# Patient Record
Sex: FEMALE | Race: WHITE | NOT HISPANIC OR LATINO | Employment: FULL TIME | URBAN - METROPOLITAN AREA
[De-identification: names, ages, dates, MRNs, and addresses within clinical notes are randomized per-mention and may not be internally consistent; named-entity substitution may affect disease eponyms.]

---

## 2018-04-18 ENCOUNTER — TRANSFERRED RECORDS (OUTPATIENT)
Dept: HEALTH INFORMATION MANAGEMENT | Facility: CLINIC | Age: 23
End: 2018-04-18

## 2018-04-18 LAB — HIV 1&2 EXT: NORMAL

## 2020-01-23 NOTE — PROGRESS NOTES
Subjective     Joelle De La Torre is a 24 year old female who presents to clinic today for the following health issues:    Patient is on birth control but is unsure of he name.     History of Present Illness        She eats 2-3 servings of fruits and vegetables daily.She consumes 0 sweetened beverage(s) daily.She exercises with enough effort to increase her heart rate 20 to 29 minutes per day.  She exercises with enough effort to increase her heart rate 4 days per week.   She is taking medications regularly.     Vaginal Symptoms  Onset: 2 years     Description:  Vaginal Discharge: none right now, did have curd like discharge at one time  Itching (Pruritis): YES  Burning sensation:  yes  Odor: Yes a little, not so much anymore     Accompanying Signs & Symptoms:  Pain with Urination: no   Abdominal Pain: yes during sexual intercourse    Fever: no     History:   Sexually active: YES  New Partner: no   Possibility of Pregnancy:  No    Precipitating factors:   Recent Antibiotic Use: no     Alleviating factors:      Therapies Tried and outcome: Has been tested for every STD, patient states that she has severe itching in her vaginal area, gets micro cuts after sexual intercourse. Has tried monistat that made her worse, vagisil helped a little       -------------------------------------    Patient Active Problem List   Diagnosis     Anxiety disorder     Attention deficit hyperactivity disorder (ADHD)     Dysmenorrhea     Dermatographia     History reviewed. No pertinent surgical history.    Social History     Tobacco Use     Smoking status: Never Smoker     Smokeless tobacco: Never Used   Substance Use Topics     Alcohol use: Yes     Frequency: 2-3 times a week     Drinks per session: 1 or 2     Binge frequency: Never     History reviewed. No pertinent family history.        Reviewed and updated as needed this visit by Provider  Tobacco  Allergies  Meds  Problems  Med Hx  Surg Hx  Fam Hx         Review of Systems   ROS  "COMP: Constitutional, HEENT, cardiovascular, pulmonary, GI, , musculoskeletal, neuro, skin, endocrine and psych systems are negative, except as otherwise noted.      Objective    /54 (BP Location: Left arm, Patient Position: Chair, Cuff Size: Adult Regular)   Pulse 112   Temp 99  F (37.2  C) (Temporal)   Resp 16   Ht 1.727 m (5' 8\")   Wt 60.3 kg (133 lb)   LMP 01/26/2020 (Approximate)   SpO2 99%   Breastfeeding No   BMI 20.22 kg/m    Body mass index is 20.22 kg/m .  Physical Exam   GENERAL: healthy, alert and no distress  RESP: lungs clear to auscultation - no rales, rhonchi or wheezes  CV: regular rate and rhythm, normal S1 S2, no S3 or S4, no murmur, click or rub, no peripheral edema and peripheral pulses strong  ABDOMEN: soft, nontender, no hepatosplenomegaly, no masses and bowel sounds normal   (female): normal female external genitalia other than dry skin with cracking posteriorly between the vaginal and rectal areas, normal urethral meatus, vaginal mucosa pink, moist, well rugated, and normal cervix/adnexa/uterus without masses or discharge  MS: no gross musculoskeletal defects noted, no edema  SKIN: dry skin throughout body  NEURO: Normal strength and tone, mentation intact and speech normal  PSYCH: anxious and tearful            Assessment & Plan       ICD-10-CM    1. Flexural eczema L20.82 hydrocortisone (CORTAID) 1 % external ointment   2. Screening for malignant neoplasm of cervix Z12.4           Mucosa appears normal for the vaginal area. Currently on menses, so screening for wet prep limited, may return to repeat if skin irritation treatment does not help. Previously she was reported to have some vaginal atrophy. But this appears to have improved by her hormonal treatment. Now she just has ezematous skin worse in the vaginal to rectal area. I was surprised by this location, but then she told me she has been doing warm epsan soaks with blowdryer afterwards per her last ob/gyn " recommendations which likely has overdried the area. Given her dermatographia, she is already at risk for eczema. So reasonable to give a catch up steroid dose for the area (mild due to folds and doubled up dosing) and then care instructions given as well. See below. Continue current ocps at this time as they may have helped.    Patient Instructions   Water can be your best friend or worst enemy.    If water is not your enemy, shower/bath daily.    NEVER soak in bubble bath, oils, etc.    Keep them to 15-30 minutes in lukewarm (NOT HOT) water.  After shower, pat/blot dry and apply moisturizer within minutes    Products that are advised for eczema include:  Soaps -- Dove, Caress, or Basis (only need in underarms and groin unless dirt noted)  Lotions -- Cera Ve, Cetaphil, Vanicream, Vaseline  Petroleum jelly can be used for extra moisturizer when needed but is greasy.          Return in about 3 months (around 4/29/2020) for Routine Visit.    Nicole Szymanski MD, MD  [facility]

## 2020-01-29 ENCOUNTER — OFFICE VISIT (OUTPATIENT)
Dept: FAMILY MEDICINE | Facility: OTHER | Age: 25
End: 2020-01-29
Payer: COMMERCIAL

## 2020-01-29 VITALS
HEIGHT: 68 IN | SYSTOLIC BLOOD PRESSURE: 102 MMHG | RESPIRATION RATE: 16 BRPM | TEMPERATURE: 99 F | HEART RATE: 112 BPM | WEIGHT: 133 LBS | OXYGEN SATURATION: 99 % | DIASTOLIC BLOOD PRESSURE: 54 MMHG | BODY MASS INDEX: 20.16 KG/M2

## 2020-01-29 DIAGNOSIS — Z12.4 SCREENING FOR MALIGNANT NEOPLASM OF CERVIX: ICD-10-CM

## 2020-01-29 DIAGNOSIS — L20.82 FLEXURAL ECZEMA: Primary | ICD-10-CM

## 2020-01-29 PROBLEM — F90.9 ATTENTION DEFICIT HYPERACTIVITY DISORDER (ADHD): Status: ACTIVE | Noted: 2020-01-29

## 2020-01-29 PROBLEM — L50.3 DERMATOGRAPHIA: Status: ACTIVE | Noted: 2020-01-29

## 2020-01-29 PROBLEM — L50.9 URTICARIA: Status: ACTIVE | Noted: 2020-01-29

## 2020-01-29 PROCEDURE — 99203 OFFICE O/P NEW LOW 30 MIN: CPT | Performed by: FAMILY MEDICINE

## 2020-01-29 RX ORDER — LEVONORGESTREL/ETHIN.ESTRADIOL 0.1-0.02MG
1 TABLET ORAL DAILY
Qty: 84 TABLET | Status: CANCELLED | OUTPATIENT
Start: 2020-01-29

## 2020-01-29 RX ORDER — DIAPER,BRIEF,INFANT-TODD,DISP
EACH MISCELLANEOUS DAILY
Qty: 110 G | Refills: 1 | Status: SHIPPED | OUTPATIENT
Start: 2020-01-29 | End: 2020-02-05

## 2020-01-29 SDOH — HEALTH STABILITY: MENTAL HEALTH: HOW MANY STANDARD DRINKS CONTAINING ALCOHOL DO YOU HAVE ON A TYPICAL DAY?: 1 OR 2

## 2020-01-29 SDOH — HEALTH STABILITY: MENTAL HEALTH: HOW OFTEN DO YOU HAVE A DRINK CONTAINING ALCOHOL?: 2-3 TIMES A WEEK

## 2020-01-29 SDOH — HEALTH STABILITY: MENTAL HEALTH: HOW OFTEN DO YOU HAVE 6 OR MORE DRINKS ON ONE OCCASION?: NEVER

## 2020-01-29 ASSESSMENT — MIFFLIN-ST. JEOR: SCORE: 1391.78

## 2020-01-29 NOTE — PATIENT INSTRUCTIONS
Water can be your best friend or worst enemy.    If water is not your enemy, shower/bath daily.    NEVER soak in bubble bath, oils, etc.    Keep them to 15-30 minutes in lukewarm (NOT HOT) water.  After shower, pat/blot dry and apply moisturizer within minutes    Products that are advised for eczema include:  Soaps -- Dove, Caress, or Basis (only need in underarms and groin unless dirt noted)  Lotions -- Cera Ve, Cetaphil, Vanicream, Vaseline  Petroleum jelly can be used for extra moisturizer when needed but is greasy.

## 2020-02-10 ENCOUNTER — TELEPHONE (OUTPATIENT)
Dept: FAMILY MEDICINE | Facility: OTHER | Age: 25
End: 2020-02-10

## 2020-02-10 NOTE — TELEPHONE ENCOUNTER
"Spoke to patient.     States she was recently prescribed cortisone for vaginal eczema.   Used once per day for 7 days.     Since then, noticing \"huge white patches\"   States they were zit size and are getting larger.   Now they are open sores about pen tip size.   States they are now \"open lesions.\"    States she thinks things are getting much worse since stopping the steroid cream.     Also using another lotion on skin.     No baths.   Discharge has gotten at worse, heavier and is now thick and white.   Not enough to wear a pad for the discharge.   States it burns, states it looks like blisters.   Not enough for her to be seen in ED or UC.   States sitting is uncomfortable at this point.     States she is also having some hives off and on. Go away pretty quickly without treatment.     RN advised to be seen sooner. Patient states she wants to only see Dr. Szymanski. Patient states she will call to get seen sooner.     Leonila Vines RN BSN    "

## 2020-02-10 NOTE — TELEPHONE ENCOUNTER
Reason for call:  Patient reporting a symptom    Symptom or request: worsening vaginal issues    Duration (how long have symptoms been present): ~1-2 weeks    Have you been treated for this before? Yes    Additional comments: She seen Stephon on 1/29/20, issue is getting worse. Getting blisters and rx didn't help. Scheduled with Stephon 2/17, but she would like to speak with a triage nurse. Please triage.     Phone Number patient can be reached at:  Home number on file [phone] (home)    Best Time:  any    Can we leave a detailed message on this number:  YES    Call taken on 2/10/2020 at 3:38 PM by Kathy Fischer

## 2020-02-17 NOTE — PROGRESS NOTES
Subjective     Joelle De La Torre is a 24 year old female who presents to clinic today for the following health issues:    HPI   Vaginal Symptoms  Onset:   Currently on last day of meds     Description:  Vaginal Discharge: white   Itching (Pruritis): no   Burning sensation:  no   Odor: no     Accompanying Signs & Symptoms:  Pain with Urination: no   Abdominal Pain: no   Fever: no     History:   Sexually active: YES  New Partner: no   Possibility of Pregnancy:  No    Precipitating factors:   Recent Antibiotic Use: no     Alleviating factors:     Has been on a course of fluconazole      -------------------------------------    Patient Active Problem List   Diagnosis     Anxiety disorder     Attention deficit hyperactivity disorder (ADHD)     Dysmenorrhea     Dermatographia     History reviewed. No pertinent surgical history.    Social History     Tobacco Use     Smoking status: Never Smoker     Smokeless tobacco: Never Used   Substance Use Topics     Alcohol use: Yes     Frequency: 2-3 times a week     Drinks per session: 1 or 2     Binge frequency: Never     History reviewed. No pertinent family history.        Reviewed and updated as needed this visit by Provider  Tobacco  Allergies  Meds  Problems  Med Hx  Surg Hx  Fam Hx         Review of Systems   ROS COMP: Constitutional, HEENT, cardiovascular, pulmonary, GI, , musculoskeletal, neuro, skin, endocrine and psych systems are negative, except as otherwise noted.      Objective    BP 92/60   Pulse 78   Temp 98  F (36.7  C)   Resp 16   Wt 59.9 kg (132 lb)   LMP 01/26/2020 (Approximate)   BMI 20.07 kg/m    Body mass index is 20.07 kg/m .  Physical Exam   GENERAL: healthy, alert and no distress  RESP: lungs clear to auscultation - no rales, rhonchi or wheezes  CV: regular rate and rhythm, normal S1 S2, no S3 or S4, no murmur, click or rub, no peripheral edema and peripheral pulses strong  ABDOMEN: soft, nontender, no hepatosplenomegaly, no masses and bowel  sounds normal   (female): normal female external genitalia, normal urethral meatus, vaginal mucosa pink, moist, well rugated, and normal cervix/adnexa/uterus without masses or discharge  PSYCH: mentation appears normal, affect normal/bright            Assessment & Plan       ICD-10-CM    1. Vaginal discharge N89.8 Wet prep   2. BV (bacterial vaginosis) N76.0 metroNIDAZOLE 500 MG PO tablet    B96.89           Reassured, just BV this time. She noted increase in discharge and was worried about return of yeast. Turns out timing is just before her menses and explained that thicker discharge happens at this time. Though she also has BV which the menstrual flow may clear up for many. But also can offer her meds to clear it up as well.    Return in about 2 weeks (around 3/4/2020) for if not improved.    Nicole Szymanski MD, MD  [facility]

## 2020-02-19 ENCOUNTER — OFFICE VISIT (OUTPATIENT)
Dept: FAMILY MEDICINE | Facility: OTHER | Age: 25
End: 2020-02-19
Payer: COMMERCIAL

## 2020-02-19 VITALS
RESPIRATION RATE: 16 BRPM | TEMPERATURE: 98 F | BODY MASS INDEX: 20.07 KG/M2 | WEIGHT: 132 LBS | SYSTOLIC BLOOD PRESSURE: 92 MMHG | DIASTOLIC BLOOD PRESSURE: 60 MMHG | HEART RATE: 78 BPM

## 2020-02-19 DIAGNOSIS — B96.89 BV (BACTERIAL VAGINOSIS): ICD-10-CM

## 2020-02-19 DIAGNOSIS — N76.0 BV (BACTERIAL VAGINOSIS): ICD-10-CM

## 2020-02-19 DIAGNOSIS — N89.8 VAGINAL DISCHARGE: Primary | ICD-10-CM

## 2020-02-19 PROCEDURE — 99213 OFFICE O/P EST LOW 20 MIN: CPT | Performed by: FAMILY MEDICINE

## 2020-02-19 PROCEDURE — 87210 SMEAR WET MOUNT SALINE/INK: CPT | Performed by: FAMILY MEDICINE

## 2020-02-19 RX ORDER — DESOGESTREL AND ETHINYL ESTRADIOL 0.15-0.03
1 KIT ORAL
COMMUNITY
Start: 2019-08-08 | End: 2020-09-30

## 2020-02-19 RX ORDER — NYSTATIN 100000 U/G
1 OINTMENT TOPICAL
COMMUNITY
Start: 2020-02-10 | End: 2020-02-20

## 2020-02-19 RX ORDER — FLUCONAZOLE 100 MG/1
100 TABLET ORAL
COMMUNITY
Start: 2020-02-10 | End: 2020-09-30

## 2020-02-19 RX ORDER — METRONIDAZOLE 500 MG/1
500 TABLET ORAL 2 TIMES DAILY
Qty: 14 TABLET | Refills: 0 | Status: SHIPPED | OUTPATIENT
Start: 2020-02-19 | End: 2020-02-26

## 2020-02-19 ASSESSMENT — PAIN SCALES - GENERAL: PAINLEVEL: NO PAIN (0)

## 2020-02-20 LAB
SPECIMEN SOURCE: ABNORMAL
WET PREP SPEC: ABNORMAL

## 2020-08-17 NOTE — PROGRESS NOTES
SUBJECTIVE:   CC: Joelle De La Torre is an 25 year old woman who presents for preventive health visit.     Healthy Habits:     Getting at least 3 servings of Calcium per day:  NO    Bi-annual eye exam:  NO    Dental care twice a year:  NO    Sleep apnea or symptoms of sleep apnea:  None    Diet:  Regular (no restrictions)    Frequency of exercise:  1 day/week    Duration of exercise:  Less than 15 minutes    Taking medications regularly:  Yes    Barriers to taking medications:  None    Medication side effects:  None    PHQ-2 Total Score: 3    Additional concerns today:  No      Discussed valencia h/o ADHD and stopped taking meds as she got anxious with her mom's death many years ago. Has had since childhood and records on care everywhere. She has been off all meds for awhile and is having compounding anxiety now again. Started with counseling this am and identified focus as an area to work on and she came with the question whether she should consider return to [facility].         Today's PHQ-2 Score:   PHQ-2 ( 1999 [employer]) 8/20/2020   Q1: Little interest or pleasure in doing things 1   Q2: Feeling down, depressed or hopeless 2   PHQ-2 Score 3   Q1: Little interest or pleasure in doing things Several days   Q2: Feeling down, depressed or hopeless More than half the days   PHQ-2 Score 3       Abuse: Current or Past(Physical, Sexual or Emotional)- No  Do you feel safe in your environment? Yes        Social History     Tobacco Use     Smoking status: Never Smoker     Smokeless tobacco: Never Used   Substance Use Topics     Alcohol use: Yes     Frequency: 2-3 times a week     Drinks per session: 1 or 2     Binge frequency: Never     If you drink alcohol do you typically have >3 drinks per day or >7 drinks per week? No    Alcohol Use 8/20/2020   Prescreen: >3 drinks/day or >7 drinks/week? No   Prescreen: >3 drinks/day or >7 drinks/week? -       Reviewed orders with patient.  Reviewed health maintenance and updated orders  "accordingly - Yes      Mammogram not appropriate for this patient based on age.    Pertinent mammograms are reviewed under the imaging tab.  History of abnormal Pap smear: NO - age 21-29 PAP every 3 years recommended     Reviewed and updated as needed this visit by clinical staff  Tobacco  Allergies  Meds  Problems  Med Hx  Surg Hx  Fam Hx  Soc Hx          Reviewed and updated as needed this visit by Provider  Tobacco  Allergies  Meds  Problems  Med Hx  Surg Hx  Fam Hx            Review of Systems   Constitutional: Negative for chills and fever.   HENT: Negative for congestion, ear pain, hearing loss and sore throat.    Eyes: Negative for pain and visual disturbance.   Respiratory: Negative for cough and shortness of breath.    Cardiovascular: Positive for chest pain. Negative for palpitations and peripheral edema.   Gastrointestinal: Negative for abdominal pain, constipation, diarrhea, heartburn, hematochezia and nausea.   Breasts:  Negative for tenderness, breast mass and discharge.   Genitourinary: Negative for dysuria, frequency, genital sores, hematuria, pelvic pain, urgency, vaginal bleeding and vaginal discharge.   Musculoskeletal: Negative for arthralgias, joint swelling and myalgias.   Skin: Negative for rash.   Neurological: Positive for weakness and headaches. Negative for dizziness and paresthesias.   Psychiatric/Behavioral: Positive for mood changes. The patient is nervous/anxious.           OBJECTIVE:   /70   Pulse 80   Temp 97.5  F (36.4  C)   Resp 14   Ht 1.753 m (5' 9\")   Wt 58.7 kg (129 lb 8 oz)   LMP 08/13/2020 (Approximate)   BMI 19.12 kg/m    Physical Exam  GENERAL: healthy, alert and no distress  EYES: Eyes grossly normal to inspection, PERRL and conjunctivae and sclerae normal  HENT: ear canals and TM's normal, nose and mouth without ulcers or lesions  NECK: no adenopathy, no asymmetry, masses, or scars and thyroid normal to palpation  RESP: lungs clear to " auscultation - no rales, rhonchi or wheezes  CV: regular rate and rhythm, normal S1 S2, no S3 or S4, no murmur, click or rub, no peripheral edema and peripheral pulses strong  ABDOMEN: soft, nontender, no hepatosplenomegaly, no masses and bowel sounds normal   (female): normal female external genitalia, normal urethral meatus, vaginal mucosa pink, moist, well rugated, and normal cervix/adnexa/uterus without masses or discharge  MS: no gross musculoskeletal defects noted, no edema  SKIN: no suspicious lesions or rashes  NEURO: Normal strength and tone, mentation intact and speech normal  PSYCH: anxious today        ASSESSMENT/PLAN:       ICD-10-CM    1. Routine general medical examination at a health care facility  Z00.00    2. Generalized anxiety disorder  F41.1    3. Attention deficit hyperactivity disorder (ADHD), predominantly inattentive type  F90.0 amphetamine-dextroamphetamine (ADDERALL XR) 15 MG 24 hr capsule     Drug Abuse Screen Panel 13, Urine (Pain Care Package)   4. Screening for malignant neoplasm of cervix  Z12.4 Pap imaged thin layer screen reflex to HPV if ASCUS - recommend age 25 - 29         PAtient had long history of ADHD which she has not been treating but has been noticing more difficulty in functioning again. Has an upcoming class which she states she will not be able to complete without some help. She has anxiety at this time as welll and has been working with counseling. Will need close monitoring for control. Discussed how stimulants can worsening anxiety and that the primary issue for her as she was growing up was adhd, so likely she may still find improvement. Will attempt to return to her last known dose for control. Follow-up 3 weeks.    COUNSELING:  Reviewed preventive health counseling, as reflected in patient instructions       Regular exercise       Healthy diet/nutrition    Estimated body mass index is 19.12 kg/m  as calculated from the following:    Height as of this encounter:  "1.753 m (5' 9\").    Weight as of this encounter: 58.7 kg (129 lb 8 oz).         reports that she has never smoked. She has never used smokeless tobacco.      Counseling Resources:  ATP IV Guidelines  Pooled Cohorts Equation Calculator  Breast Cancer Risk Calculator  FRAX Risk Assessment  ICSI Preventive Guidelines  Dietary Guidelines for Americans, 2010  [employer]'s My[employer]ate  ASA Prophylaxis  Lung CA Screening    Nicole Szymanski MD, MD  [facility]  Answers for HPI/ROS submitted by the patient on 8/20/2020   Annual Exam:  If you checked off any problems, how difficult have these problems made it for you to do your work, take care of things at home, or get along with other people?: Very difficult  PHQ9 TOTAL SCORE: 15  DAYSI 7 TOTAL SCORE: 10    "

## 2020-08-20 ENCOUNTER — OFFICE VISIT (OUTPATIENT)
Dept: FAMILY MEDICINE | Facility: OTHER | Age: 25
End: 2020-08-20
Payer: COMMERCIAL

## 2020-08-20 VITALS
WEIGHT: 129.5 LBS | TEMPERATURE: 97.5 F | SYSTOLIC BLOOD PRESSURE: 104 MMHG | DIASTOLIC BLOOD PRESSURE: 70 MMHG | RESPIRATION RATE: 14 BRPM | BODY MASS INDEX: 19.18 KG/M2 | HEIGHT: 69 IN | HEART RATE: 80 BPM

## 2020-08-20 DIAGNOSIS — Z00.00 ROUTINE GENERAL MEDICAL EXAMINATION AT A HEALTH CARE FACILITY: Primary | ICD-10-CM

## 2020-08-20 DIAGNOSIS — F90.0 ATTENTION DEFICIT HYPERACTIVITY DISORDER (ADHD), PREDOMINANTLY INATTENTIVE TYPE: ICD-10-CM

## 2020-08-20 DIAGNOSIS — Z12.4 SCREENING FOR MALIGNANT NEOPLASM OF CERVIX: ICD-10-CM

## 2020-08-20 DIAGNOSIS — F41.1 GENERALIZED ANXIETY DISORDER: ICD-10-CM

## 2020-08-20 LAB
AMPHETAMINES UR QL: NOT DETECTED NG/ML
BARBITURATES UR QL SCN: NOT DETECTED NG/ML
BENZODIAZ UR QL SCN: NOT DETECTED NG/ML
BUPRENORPHINE UR QL: NOT DETECTED NG/ML
CANNABINOIDS UR QL: NOT DETECTED NG/ML
COCAINE UR QL SCN: NOT DETECTED NG/ML
D-METHAMPHET UR QL: NOT DETECTED NG/ML
METHADONE UR QL SCN: NOT DETECTED NG/ML
OPIATES UR QL SCN: NOT DETECTED NG/ML
OXYCODONE UR QL SCN: NOT DETECTED NG/ML
PCP UR QL SCN: NOT DETECTED NG/ML
PROPOXYPH UR QL: NOT DETECTED NG/ML
TRICYCLICS UR QL SCN: NOT DETECTED NG/ML

## 2020-08-20 PROCEDURE — G0145 SCR C/V CYTO,THINLAYER,RESCR: HCPCS | Performed by: FAMILY MEDICINE

## 2020-08-20 PROCEDURE — 99213 OFFICE O/P EST LOW 20 MIN: CPT | Mod: 25 | Performed by: FAMILY MEDICINE

## 2020-08-20 PROCEDURE — 80306 DRUG TEST PRSMV INSTRMNT: CPT | Performed by: FAMILY MEDICINE

## 2020-08-20 PROCEDURE — 99395 PREV VISIT EST AGE 18-39: CPT | Performed by: FAMILY MEDICINE

## 2020-08-20 RX ORDER — DEXTROAMPHETAMINE SACCHARATE, AMPHETAMINE ASPARTATE MONOHYDRATE, DEXTROAMPHETAMINE SULFATE AND AMPHETAMINE SULFATE 3.75; 3.75; 3.75; 3.75 MG/1; MG/1; MG/1; MG/1
15 CAPSULE, EXTENDED RELEASE ORAL DAILY
Qty: 30 CAPSULE | Refills: 0 | Status: SHIPPED | OUTPATIENT
Start: 2020-08-20 | End: 2020-09-30

## 2020-08-20 ASSESSMENT — ENCOUNTER SYMPTOMS
EYE PAIN: 0
JOINT SWELLING: 0
MYALGIAS: 0
SHORTNESS OF BREATH: 0
ARTHRALGIAS: 0
HEMATURIA: 0
FREQUENCY: 0
WEAKNESS: 1
CHILLS: 0
BREAST MASS: 0
DYSURIA: 0
ABDOMINAL PAIN: 0
PALPITATIONS: 0
NAUSEA: 0
DIARRHEA: 0
DIZZINESS: 0
SORE THROAT: 0
NERVOUS/ANXIOUS: 1
HEMATOCHEZIA: 0
FEVER: 0
HEADACHES: 1
COUGH: 0
HEARTBURN: 0
PARESTHESIAS: 0
CONSTIPATION: 0

## 2020-08-20 ASSESSMENT — PATIENT HEALTH QUESTIONNAIRE - PHQ9
SUM OF ALL RESPONSES TO PHQ QUESTIONS 1-9: 15
5. POOR APPETITE OR OVEREATING: SEVERAL DAYS
10. IF YOU CHECKED OFF ANY PROBLEMS, HOW DIFFICULT HAVE THESE PROBLEMS MADE IT FOR YOU TO DO YOUR WORK, TAKE CARE OF THINGS AT HOME, OR GET ALONG WITH OTHER PEOPLE: VERY DIFFICULT
SUM OF ALL RESPONSES TO PHQ QUESTIONS 1-9: 15

## 2020-08-20 ASSESSMENT — ANXIETY QUESTIONNAIRES
GAD7 TOTAL SCORE: 10
7. FEELING AFRAID AS IF SOMETHING AWFUL MIGHT HAPPEN: SEVERAL DAYS
5. BEING SO RESTLESS THAT IT IS HARD TO SIT STILL: SEVERAL DAYS
GAD7 TOTAL SCORE: 10
6. BECOMING EASILY ANNOYED OR IRRITABLE: SEVERAL DAYS
1. FEELING NERVOUS, ANXIOUS, OR ON EDGE: MORE THAN HALF THE DAYS
3. WORRYING TOO MUCH ABOUT DIFFERENT THINGS: MORE THAN HALF THE DAYS
GAD7 TOTAL SCORE: 11
6. BECOMING EASILY ANNOYED OR IRRITABLE: SEVERAL DAYS
IF YOU CHECKED OFF ANY PROBLEMS ON THIS QUESTIONNAIRE, HOW DIFFICULT HAVE THESE PROBLEMS MADE IT FOR YOU TO DO YOUR WORK, TAKE CARE OF THINGS AT HOME, OR GET ALONG WITH OTHER PEOPLE: SOMEWHAT DIFFICULT
5. BEING SO RESTLESS THAT IT IS HARD TO SIT STILL: MORE THAN HALF THE DAYS
2. NOT BEING ABLE TO STOP OR CONTROL WORRYING: MORE THAN HALF THE DAYS
3. WORRYING TOO MUCH ABOUT DIFFERENT THINGS: MORE THAN HALF THE DAYS
7. FEELING AFRAID AS IF SOMETHING AWFUL MIGHT HAPPEN: SEVERAL DAYS
2. NOT BEING ABLE TO STOP OR CONTROL WORRYING: MORE THAN HALF THE DAYS
GAD7 TOTAL SCORE: 10
4. TROUBLE RELAXING: SEVERAL DAYS
7. FEELING AFRAID AS IF SOMETHING AWFUL MIGHT HAPPEN: SEVERAL DAYS
1. FEELING NERVOUS, ANXIOUS, OR ON EDGE: MORE THAN HALF THE DAYS

## 2020-08-20 ASSESSMENT — PAIN SCALES - GENERAL: PAINLEVEL: NO PAIN (0)

## 2020-08-20 ASSESSMENT — MIFFLIN-ST. JEOR: SCORE: 1386.79

## 2020-08-20 NOTE — LETTER
August 26, 2020      Joelle De La Torre  [id number] [facility] [address]    Dear MsElizabeth,      I am happy to inform you that your recent cervical cancer screening test (PAP smear) was normal.      Preventative screenings such as this help to ensure your health for years to come. You should repeat a pap smear in 3 years, unless otherwise directed.      You will still need to return to the clinic every year for your annual exam and other preventive tests.     If you have additional questions regarding this result, please call our registered nurse, Lillie at [phone].      Sincerely,      Nicole Szymanski MD/guerline

## 2020-08-20 NOTE — LETTER
August 20, 2020      Joelle De La Torre  [id number] [facility] [place] [address]        Dear Joelle,     Your recent lab testing was normal.    Results for orders placed or performed in visit on 08/20/20   Drug Abuse Screen Panel 13, Urine (Pain Care Package)     Status: None   Result Value Ref Range    Cannabinoids (70-frs-4-carboxy-9-THC) Not Detected NDET^Not Detected ng/mL    Phencyclidine (Phencyclidine) Not Detected NDET^Not Detected ng/mL    Cocaine (Benzoylecgonine) Not Detected NDET^Not Detected ng/mL    Methamphetamine (d-Methamphetamine) Not Detected NDET^Not Detected ng/mL    Opiates (Morphine) Not Detected NDET^Not Detected ng/mL    Amphetamine (d-Amphetamine) Not Detected NDET^Not Detected ng/mL    Benzodiazepines (Nordiazepam) Not Detected NDET^Not Detected ng/mL    Tricyclic Antidepressants (Desipramine) Not Detected NDET^Not Detected ng/mL    Methadone (Methadone) Not Detected NDET^Not Detected ng/mL    Barbiturates (Butalbital) Not Detected NDET^Not Detected ng/mL    Oxycodone (Oxycodone) Not Detected NDET^Not Detected ng/mL    Propoxyphene (Norpropoxyphene) Not Detected NDET^Not Detected ng/mL    Buprenorphine (Buprenorphine) Not Detected NDET^Not Detected ng/mL       Please let us know if you have any further questions or concerns.    Thank you,  [facility] Care Team

## 2020-08-21 ASSESSMENT — PATIENT HEALTH QUESTIONNAIRE - PHQ9: SUM OF ALL RESPONSES TO PHQ QUESTIONS 1-9: 15

## 2020-08-24 LAB
COPATH REPORT: NORMAL
PAP: NORMAL

## 2020-09-25 NOTE — PROGRESS NOTES
"SUBJECTIVE:  Joelle is here today to recheck ADHD/ADD.    Updates since last visit: is feeling fatigue    Routine for taking medicine, including time: 7:00 AM  Time medicine wears off: NA  Issues at school: NA  Issues at home: little emotion but good  Control of symptoms: Yes    Side effects:  Headaches: No  Stomach aches: No  Irritability/mood swings: No  Difficulties with sleep: No  Social withdrawal: Yes   Unusual movements/tics: No  Decreased appetite: Yes     Other concerns: Feeling like a \"zombie\"    Patient Active Problem List   Diagnosis     Anxiety disorder     Attention deficit hyperactivity disorder (ADHD)     Dysmenorrhea     Dermatographia       No past medical history on file.    No past surgical history on file.    Current Outpatient Medications   Medication     amphetamine-dextroamphetamine (ADDERALL XR) 15 MG 24 hr capsule     desogestrel-ethinyl estradiol 0.15-30 MG-MCG PO tablet     fluconazole 100 MG PO tablet     No current facility-administered medications for this visit.        OBJECTIVE:  There were no vitals taken for this visit.  Gen: alert, awake, NAD  Lungs: CTA jerry  Heart: RR without murmur  Psych: mood flat, she thinks it may be energy related as she really hasn't eaten well and feels so tired. So she has tried to force herself to eat more food and just feels bloated and full      ASSESSMENT:  1. Attention deficit hyperactivity disorder (ADHD), predominantly inattentive type        PLAN:  Discussed and agreed to a trial of lower dose meds, though she is appearing to have gained 2 lbs despite the c/o appetite loss, though this may be time of day eating and food choices. She is c/o atypical side effects in several ways, so may need to keep a close eye on mood as well.  Ok for virtual, plan follow-up 1 month.  No follow-ups on file.      Nicole Szymanski MD      Answers for HPI/ROS submitted by the patient on 9/30/2020   Chronic problems general questions HPI Form  If you checked off any problems, " how difficult have these problems made it for you to do your work, take care of things at home, or get along with other people?: Not difficult at all  PHQ9 TOTAL SCORE: 1  DAYSI 7 TOTAL SCORE: 0  How many servings of fruits and vegetables do you eat daily?: 0-1  On average, how many sweetened beverages do you drink each day (Examples: soda, juice, sweet tea, etc.  Do NOT count diet or artificially sweetened beverages)?: 0  How many minutes a day do you exercise enough to make your heart beat faster?: 10 to 19  How many days a week do you exercise enough to make your heart beat faster?: 3 or less  How many days per week do you miss taking your medication?: 0

## 2020-09-30 ENCOUNTER — OFFICE VISIT (OUTPATIENT)
Dept: FAMILY MEDICINE | Facility: OTHER | Age: 25
End: 2020-09-30
Payer: COMMERCIAL

## 2020-09-30 VITALS
OXYGEN SATURATION: 98 % | HEART RATE: 110 BPM | RESPIRATION RATE: 14 BRPM | WEIGHT: 131.5 LBS | TEMPERATURE: 98.3 F | DIASTOLIC BLOOD PRESSURE: 72 MMHG | BODY MASS INDEX: 19.42 KG/M2 | SYSTOLIC BLOOD PRESSURE: 120 MMHG

## 2020-09-30 DIAGNOSIS — F41.1 GENERALIZED ANXIETY DISORDER: ICD-10-CM

## 2020-09-30 DIAGNOSIS — F90.0 ATTENTION DEFICIT HYPERACTIVITY DISORDER (ADHD), PREDOMINANTLY INATTENTIVE TYPE: Primary | ICD-10-CM

## 2020-09-30 PROCEDURE — 90686 IIV4 VACC NO PRSV 0.5 ML IM: CPT | Performed by: FAMILY MEDICINE

## 2020-09-30 PROCEDURE — 99213 OFFICE O/P EST LOW 20 MIN: CPT | Mod: 25 | Performed by: FAMILY MEDICINE

## 2020-09-30 PROCEDURE — 90472 IMMUNIZATION ADMIN EACH ADD: CPT | Performed by: FAMILY MEDICINE

## 2020-09-30 PROCEDURE — 90715 TDAP VACCINE 7 YRS/> IM: CPT | Performed by: FAMILY MEDICINE

## 2020-09-30 PROCEDURE — 90471 IMMUNIZATION ADMIN: CPT | Performed by: FAMILY MEDICINE

## 2020-09-30 RX ORDER — DEXTROAMPHETAMINE SACCHARATE, AMPHETAMINE ASPARTATE MONOHYDRATE, DEXTROAMPHETAMINE SULFATE AND AMPHETAMINE SULFATE 2.5; 2.5; 2.5; 2.5 MG/1; MG/1; MG/1; MG/1
10 CAPSULE, EXTENDED RELEASE ORAL DAILY
Qty: 30 CAPSULE | Refills: 0 | Status: SHIPPED | OUTPATIENT
Start: 2020-09-30 | End: 2021-01-18

## 2020-09-30 ASSESSMENT — ANXIETY QUESTIONNAIRES
2. NOT BEING ABLE TO STOP OR CONTROL WORRYING: NOT AT ALL
GAD7 TOTAL SCORE: 0
GAD7 TOTAL SCORE: 0
4. TROUBLE RELAXING: NOT AT ALL
6. BECOMING EASILY ANNOYED OR IRRITABLE: NOT AT ALL
3. WORRYING TOO MUCH ABOUT DIFFERENT THINGS: NOT AT ALL
7. FEELING AFRAID AS IF SOMETHING AWFUL MIGHT HAPPEN: NOT AT ALL
1. FEELING NERVOUS, ANXIOUS, OR ON EDGE: NOT AT ALL
7. FEELING AFRAID AS IF SOMETHING AWFUL MIGHT HAPPEN: NOT AT ALL
GAD7 TOTAL SCORE: 0
5. BEING SO RESTLESS THAT IT IS HARD TO SIT STILL: NOT AT ALL

## 2020-09-30 ASSESSMENT — PATIENT HEALTH QUESTIONNAIRE - PHQ9
SUM OF ALL RESPONSES TO PHQ QUESTIONS 1-9: 1
SUM OF ALL RESPONSES TO PHQ QUESTIONS 1-9: 1
10. IF YOU CHECKED OFF ANY PROBLEMS, HOW DIFFICULT HAVE THESE PROBLEMS MADE IT FOR YOU TO DO YOUR WORK, TAKE CARE OF THINGS AT HOME, OR GET ALONG WITH OTHER PEOPLE: NOT DIFFICULT AT ALL

## 2020-10-01 ASSESSMENT — ANXIETY QUESTIONNAIRES: GAD7 TOTAL SCORE: 0

## 2020-10-01 ASSESSMENT — PATIENT HEALTH QUESTIONNAIRE - PHQ9: SUM OF ALL RESPONSES TO PHQ QUESTIONS 1-9: 1

## 2020-12-21 ENCOUNTER — TELEPHONE (OUTPATIENT)
Dept: FAMILY MEDICINE | Facility: OTHER | Age: 25
End: 2020-12-21

## 2021-01-15 NOTE — PROGRESS NOTES
ICD-10-CM    1. Attention deficit hyperactivity disorder (ADHD), predominantly inattentive type  F90.0 amphetamine-dextroamphetamine (ADDERALL XR) 10 MG 24 hr capsule       Discussed and signed controlled substance agreement again and plan to have 1 month recheck after regular use. Did not follow-up after last initial start, though she states she did really well with the med. Just had a lot of other things going on.        15 minutes spent on the date of the encounter doing chart review, history and exam, documentation and further activities as noted above    Nicole Szymanski MD       Subjective     Joelle De La Torre is a 25 year old female who presents to clinic today for the following health issues accompanied by her :    History of Present Illness       She eats 2-3 servings of fruits and vegetables daily.She consumes 0 sweetened beverage(s) daily.She exercises with enough effort to increase her heart rate 10 to 19 minutes per day.  She exercises with enough effort to increase her heart rate 3 or less days per week.   She is taking medications regularly.       SUBJECTIVE:  Patient is here today to recheck ADHD/ADD.    Updates since last visit: none, ran out of meds. Forgot about it since the honey moon and holidays  Routine for taking medicine, including time: 6 am  Time medicine wears off: finished the work day  Issues at school/Work: none while on meds  Issues at home: social interactions are worse since running out  Control of symptoms: well when taking meds. Ran out    Side effects:  Headaches: No  Stomach aches: No  Irritability/mood swings: No  Difficulties with sleep: No  Social withdrawal: No  Unusual movements/tics: No  Decreased appetite: No    Other concerns: ran out of meds. Not sure if HAs on weekend were due to planning for wedding, etc or if related to meds.      Review of Systems   Constitutional, HEENT, cardiovascular, pulmonary, GI, , musculoskeletal, neuro, skin, endocrine and psych systems are  "negative, except as otherwise noted.      Objective    /58   Pulse 97   Temp 98.9  F (37.2  C) (Temporal)   Resp 16   Ht 1.75 m (5' 8.9\")   Wt 60.8 kg (134 lb)   LMP 12/28/2020 (Exact Date)   SpO2 100%   BMI 19.85 kg/m    Body mass index is 19.85 kg/m .  Physical Exam   GENERAL: healthy, alert and no distress  RESP: lungs clear to auscultation - no rales, rhonchi or wheezes  CV: regular rate and rhythm, normal S1 S2, no S3 or S4, no murmur, click or rub, no peripheral edema and peripheral pulses strong  MS: no gross musculoskeletal defects noted, no edema  PSYCH: mentation appears normal, affect normal/bright          Answers for HPI/ROS submitted by the patient on 1/18/2021   Chronic problems general questions HPI Form  If you checked off any problems, how difficult have these problems made it for you to do your work, take care of things at home, or get along with other people?: Not difficult at all  PHQ9 TOTAL SCORE: 1  DYASI 7 TOTAL SCORE: 3    "

## 2021-01-18 ENCOUNTER — OFFICE VISIT (OUTPATIENT)
Dept: FAMILY MEDICINE | Facility: OTHER | Age: 26
End: 2021-01-18
Payer: COMMERCIAL

## 2021-01-18 VITALS
RESPIRATION RATE: 16 BRPM | WEIGHT: 134 LBS | HEART RATE: 97 BPM | DIASTOLIC BLOOD PRESSURE: 58 MMHG | TEMPERATURE: 98.9 F | SYSTOLIC BLOOD PRESSURE: 104 MMHG | OXYGEN SATURATION: 100 % | BODY MASS INDEX: 19.85 KG/M2 | HEIGHT: 69 IN

## 2021-01-18 DIAGNOSIS — F90.0 ATTENTION DEFICIT HYPERACTIVITY DISORDER (ADHD), PREDOMINANTLY INATTENTIVE TYPE: ICD-10-CM

## 2021-01-18 PROCEDURE — 99213 OFFICE O/P EST LOW 20 MIN: CPT | Performed by: FAMILY MEDICINE

## 2021-01-18 RX ORDER — DEXTROAMPHETAMINE SACCHARATE, AMPHETAMINE ASPARTATE MONOHYDRATE, DEXTROAMPHETAMINE SULFATE AND AMPHETAMINE SULFATE 2.5; 2.5; 2.5; 2.5 MG/1; MG/1; MG/1; MG/1
10 CAPSULE, EXTENDED RELEASE ORAL DAILY
Qty: 30 CAPSULE | Refills: 0 | Status: SHIPPED | OUTPATIENT
Start: 2021-01-18 | End: 2021-07-16

## 2021-01-18 ASSESSMENT — PATIENT HEALTH QUESTIONNAIRE - PHQ9
SUM OF ALL RESPONSES TO PHQ QUESTIONS 1-9: 1
10. IF YOU CHECKED OFF ANY PROBLEMS, HOW DIFFICULT HAVE THESE PROBLEMS MADE IT FOR YOU TO DO YOUR WORK, TAKE CARE OF THINGS AT HOME, OR GET ALONG WITH OTHER PEOPLE: NOT DIFFICULT AT ALL
SUM OF ALL RESPONSES TO PHQ QUESTIONS 1-9: 1

## 2021-01-18 ASSESSMENT — ANXIETY QUESTIONNAIRES
2. NOT BEING ABLE TO STOP OR CONTROL WORRYING: SEVERAL DAYS
6. BECOMING EASILY ANNOYED OR IRRITABLE: NOT AT ALL
1. FEELING NERVOUS, ANXIOUS, OR ON EDGE: SEVERAL DAYS
3. WORRYING TOO MUCH ABOUT DIFFERENT THINGS: SEVERAL DAYS
5. BEING SO RESTLESS THAT IT IS HARD TO SIT STILL: NOT AT ALL
4. TROUBLE RELAXING: NOT AT ALL
GAD7 TOTAL SCORE: 3
7. FEELING AFRAID AS IF SOMETHING AWFUL MIGHT HAPPEN: NOT AT ALL
7. FEELING AFRAID AS IF SOMETHING AWFUL MIGHT HAPPEN: NOT AT ALL

## 2021-01-18 ASSESSMENT — MIFFLIN-ST. JEOR: SCORE: 1405.58

## 2021-01-18 NOTE — LETTER
[facility]  01/18/21    Patient: Joelle De La Torre  YOB: 1995  Medical Record Number: [id number]  CSN: [id number]                                                                              Non-opioid Controlled Substance Agreement    I understand that my care provider has prescribed a controlled substance to help manage my condition(s). I am taking this medicine to help me function or work. I know this is strong medicine, and that it can cause serious side effects. Controlled substances can be sedating, addicting and may cause a dependency on the drug. They can affect my ability to drive or think, and cause depression. They need to be taken exactly as prescribed. Combining controlled substances with certain medicines or chemicals (such as cocaine, sedatives and tranquilizers, sleeping pills, meth) can be dangerous or even fatal. Also, if I stop controlled substances suddenly, I may have severe withdrawal symptoms.  If not helpful, I may be asked to stop them.    The risks, benefits, and side effects of these medicine(s) were explained to me. I agree that:    1. I will take part in other treatments as advised by my care team. This may be psychiatry or counseling, physical therapy, behavioral therapy, group treatment or a referral to a pain clinic. I will reduce or stop my medicine when my care team tells me to do so.  2. I will take my medicines as prescribed. I will not change the dose or schedule unless my care team tells me to. There will be no refills if I  run out early.   I may be contactedwithout warning and asked to complete a urine drug test or pill count at any time.   3. I will keep all my appointments, and understand this is part of the monitoring of controlled substances. My care team may require an office visit for EVERY controlled substance refill. If I miss appointments or don t follow instructions, my care team may stop my medicine.  4. I will not ask other  providers to prescribe controlled substances, and I will not accept controlled substances from other people. If I need another prescribed controlled substance for a new reason, I will tell my care team within 1 business day.  5. I will use one pharmacy to fill all of my controlled substance prescriptions, and it is up to me to make sure that I do not run out of my medicines on weekends or holidays. If my care team is willing to refill my controlled substance prescription without a visit, I must request refills only during office hours, refills may take up to 3 days to process, and it may take up to 5 to 7 days for my medicine to be mailed and ready at my pharmacy. Prescriptions will not be mailed anywhere except my pharmacy.    6. I am responsible for my prescriptions. If the medicine/prescription is lost or stolen, it will not be replaced. I also agree not to share controlled substance medicines with anyone.              [facility]  01/18/21  Patient:  Joelle De La Torre  YOB: 1995  Medical Record Number: [id number]  [facility]: [id number]    7. I agree to not use ANY illegal or recreational drugs. This includes marijuana, cocaine, bath salts or other drugs. I agree not to use alcohol unless my care team says I may. I agree to give urine samples whenever asked. If I don t give a urine sample, the care team may stop my medicine.    8. If I enroll in the [place] Medical Marijuana program, I will tell my care team. I will also sign an agreement to share my medical records with my care team.    9. I will bring in my list of medicines (or my medicine bottles) each time I come to the clinic.   10. I will tell my care team right away if I become pregnant or have a new medical problem treated outside of my regular clinic.  11. I understand that this medicine can affect my thinking and judgment. It may be unsafe for me to drive, use machinery and do dangerous tasks. I will not do any of these  things until I know how the medicine affects me. If my dose changes, I will wait to see how it affects me. I will contact my care team if I have concerns about medicine side effects.    I understand that if I do not follow any of the conditions above, my prescriptions or treatment may be stopped.      I agree that my provider, clinic care team, and pharmacy may work with any city, state or federal law enforcement agency that investigates the misuse, sale, or other diversion of my controlled medicine. I will allow my provider to discuss my care with or share a copy of this agreement with any other treating provider, pharmacy or emergency room where I receive care. I agree to give up (waive) any right of privacy or confidentiality with respect to these consents.   I have read this agreement and have asked questions about anything I did not understand.    ____________________________________________________    ____________  ________  Patient signature                                                         Date      Time    ____________________________________________________     ____________  ________  Witness                                                          Date      Time    ____________________________________________________  Provider signature

## 2021-01-19 ASSESSMENT — ANXIETY QUESTIONNAIRES: GAD7 TOTAL SCORE: 3

## 2021-01-19 ASSESSMENT — PATIENT HEALTH QUESTIONNAIRE - PHQ9: SUM OF ALL RESPONSES TO PHQ QUESTIONS 1-9: 1

## 2021-02-15 NOTE — PROGRESS NOTES
Joelle is a 25 year old who is being evaluated via a billable video visit.      How would you like to obtain your AVS? MyChart  If the video visit is dropped, the invitation should be resent by: Text to cell phone: [phone]  Will anyone else be joining your video visit? No      Video Start Time: 7:49 AM    Assessment & Plan       ICD-10-CM    1. Attention deficit hyperactivity disorder (ADHD), predominantly inattentive type  F90.0 amphetamine-dextroamphetamine (ADDERALL XR) 10 MG 24 hr capsule     amphetamine-dextroamphetamine (ADDERALL XR) 10 MG 24 hr capsule     amphetamine-dextroamphetamine (ADDERALL XR) 10 MG 24 hr capsule      Doing well at current dosing, will renew 3 months. Discussed hiv and hep C and will consider if drawing blood at some point, but not urgently and not in clinic at this time.    10 minutes spent on the date of the encounter doing chart review, history and exam, documentation and further activities as noted above           Return in about 3 months (around 5/18/2021) for adhd.    Nicole Szymanski MD, MD  [facility]    Subjective   Joelle is a 25 year old who presents for the following health issues     HPI     SUBJECTIVE:  Patient is here today to recheck ADHD/ADD.    Updates since last visit: none  Routine for taking medicine, including time: 6am daily   Time medicine wears off: 4pm  Issues at school/Work: none  Issues at home: none  Control of symptoms: yes    Side effects:  Headaches: No  Stomach aches: No  Irritability/mood swings: No  Difficulties with sleep: No  Social withdrawal: Yes   Unusual movements/tics: No  Decreased appetite: No    Other concerns: none        Review of Systems   Constitutional, HEENT, cardiovascular, pulmonary, GI, , musculoskeletal, neuro, skin, endocrine and psych systems are negative, except as otherwise noted.      Objective    Vitals - Patient Reported  Pain Score: No Pain (0)      Vitals:  No vitals were obtained today due to virtual  visit.    Physical Exam   GENERAL: Healthy, alert and no distress  EYES: Eyes grossly normal to inspection.  No discharge or erythema, or obvious scleral/conjunctival abnormalities.  RESP: No audible wheeze, cough, or visible cyanosis.  No visible retractions or increased work of breathing.    SKIN: Visible skin clear. No significant rash, abnormal pigmentation or lesions.  NEURO: Cranial nerves grossly intact.  Mentation and speech appropriate for age.  PSYCH: Mentation appears normal, affect normal/bright, judgement and insight intact, normal speech and appearance well-groomed.            Video-Visit Details    Type of service:  Video Visit    Video End Time:7:55 AM    Originating Location (pt. Location): Home    Distant Location (provider location):  [facility]     Platform used for Video Visit: [employer]

## 2021-02-18 ENCOUNTER — VIRTUAL VISIT (OUTPATIENT)
Dept: FAMILY MEDICINE | Facility: OTHER | Age: 26
End: 2021-02-18
Payer: COMMERCIAL

## 2021-02-18 DIAGNOSIS — F90.0 ATTENTION DEFICIT HYPERACTIVITY DISORDER (ADHD), PREDOMINANTLY INATTENTIVE TYPE: ICD-10-CM

## 2021-02-18 PROCEDURE — 99213 OFFICE O/P EST LOW 20 MIN: CPT | Mod: 95 | Performed by: FAMILY MEDICINE

## 2021-02-18 RX ORDER — DEXTROAMPHETAMINE SACCHARATE, AMPHETAMINE ASPARTATE MONOHYDRATE, DEXTROAMPHETAMINE SULFATE AND AMPHETAMINE SULFATE 2.5; 2.5; 2.5; 2.5 MG/1; MG/1; MG/1; MG/1
10 CAPSULE, EXTENDED RELEASE ORAL DAILY
Qty: 30 CAPSULE | Refills: 0 | Status: SHIPPED | OUTPATIENT
Start: 2021-03-21 | End: 2021-03-25 | Stop reason: ALTCHOICE

## 2021-02-18 RX ORDER — DEXTROAMPHETAMINE SACCHARATE, AMPHETAMINE ASPARTATE MONOHYDRATE, DEXTROAMPHETAMINE SULFATE AND AMPHETAMINE SULFATE 2.5; 2.5; 2.5; 2.5 MG/1; MG/1; MG/1; MG/1
10 CAPSULE, EXTENDED RELEASE ORAL DAILY
Qty: 30 CAPSULE | Refills: 0 | Status: SHIPPED | OUTPATIENT
Start: 2021-04-21 | End: 2021-03-25 | Stop reason: ALTCHOICE

## 2021-02-18 RX ORDER — DEXTROAMPHETAMINE SACCHARATE, AMPHETAMINE ASPARTATE MONOHYDRATE, DEXTROAMPHETAMINE SULFATE AND AMPHETAMINE SULFATE 2.5; 2.5; 2.5; 2.5 MG/1; MG/1; MG/1; MG/1
10 CAPSULE, EXTENDED RELEASE ORAL DAILY
Qty: 30 CAPSULE | Refills: 0 | Status: SHIPPED | OUTPATIENT
Start: 2021-02-18 | End: 2021-03-20

## 2021-02-18 ASSESSMENT — PAIN SCALES - GENERAL: PAINLEVEL: NO PAIN (0)

## 2021-03-23 NOTE — PROGRESS NOTES
Joelle is a 25 year old who is being evaluated via a billable video visit.      How would you like to obtain your AVS? MyChart  If the video visit is dropped, the invitation should be resent by: [phone]  Will anyone else be joining your video visit? No      Video Start Time: 9:03 AM    Assessment & Plan       ICD-10-CM    1. Attention deficit hyperactivity disorder (ADHD), predominantly inattentive type  F90.0 amphetamine-dextroamphetamine (ADDERALL XR) 10 MG 24 hr capsule     amphetamine-dextroamphetamine (ADDERALL XR) 10 MG 24 hr capsule     amphetamine-dextroamphetamine (ADDERALL XR) 10 MG 24 hr capsule      Patient with well controlled and adjusted well to the side effects for ADHD. Is interested in updating STD screening at physical, but not yet due, will update HM as she has completed in past.  She is doing well for BP as well when she has checked. Meds refilled. Follow-up 3 months.      20 minutes spent on the date of the encounter doing chart review, history and exam, documentation and further activities as noted above           Return in about 3 months (around 6/25/2021) for adhd.    Nicole Szymanski MD, MD  [facility]   Joelle is a 25 year old who presents for the following health issues     HPI       Joelle is here today to recheck ADHD/ADD.    Updates since last visit: none    Routine for taking medicine, including time: in AM  Time medicine wears off: about 4pm  Issues at work: none   Issues at home: none  Control of symptoms: yes    Side effects:  Headaches: No  Stomach aches: No  Irritability/mood swings: No  Difficulties with sleep: No  Social withdrawal: No  Unusual movements/tics: No  Decreased appetite: No    Other concerns: none        Review of Systems   Constitutional, HEENT, cardiovascular, pulmonary, GI, , musculoskeletal, neuro, skin, endocrine and psych systems are negative, except as otherwise noted.      Objective           Vitals:  No vitals were obtained  today due to virtual visit.    Physical Exam   GENERAL: Healthy, alert and no distress  EYES: Eyes grossly normal to inspection.  No discharge or erythema, or obvious scleral/conjunctival abnormalities.  RESP: No audible wheeze, cough, or visible cyanosis.  No visible retractions or increased work of breathing.    SKIN: Visible skin clear. No significant rash, abnormal pigmentation or lesions.  NEURO: Cranial nerves grossly intact.  Mentation and speech appropriate for age.  PSYCH: Mentation appears normal, affect normal/bright, judgement and insight intact, normal speech and appearance well-groomed.                Video-Visit Details    Type of service:  Video Visit    Video End Time:9:09 AM    Originating Location (pt. Location): Home    Distant Location (provider location):  [facility]     Platform used for Video Visit: [employer]

## 2021-03-25 ENCOUNTER — VIRTUAL VISIT (OUTPATIENT)
Dept: FAMILY MEDICINE | Facility: OTHER | Age: 26
End: 2021-03-25
Payer: COMMERCIAL

## 2021-03-25 DIAGNOSIS — F90.0 ATTENTION DEFICIT HYPERACTIVITY DISORDER (ADHD), PREDOMINANTLY INATTENTIVE TYPE: Primary | ICD-10-CM

## 2021-03-25 PROCEDURE — 99213 OFFICE O/P EST LOW 20 MIN: CPT | Mod: 95 | Performed by: FAMILY MEDICINE

## 2021-03-25 RX ORDER — DEXTROAMPHETAMINE SACCHARATE, AMPHETAMINE ASPARTATE MONOHYDRATE, DEXTROAMPHETAMINE SULFATE AND AMPHETAMINE SULFATE 2.5; 2.5; 2.5; 2.5 MG/1; MG/1; MG/1; MG/1
10 CAPSULE, EXTENDED RELEASE ORAL DAILY
Qty: 30 CAPSULE | Refills: 0 | Status: SHIPPED | OUTPATIENT
Start: 2021-05-26 | End: 2021-06-25

## 2021-03-25 RX ORDER — DEXTROAMPHETAMINE SACCHARATE, AMPHETAMINE ASPARTATE MONOHYDRATE, DEXTROAMPHETAMINE SULFATE AND AMPHETAMINE SULFATE 2.5; 2.5; 2.5; 2.5 MG/1; MG/1; MG/1; MG/1
10 CAPSULE, EXTENDED RELEASE ORAL DAILY
Qty: 30 CAPSULE | Refills: 0 | Status: SHIPPED | OUTPATIENT
Start: 2021-04-25 | End: 2021-05-25

## 2021-03-25 RX ORDER — DEXTROAMPHETAMINE SACCHARATE, AMPHETAMINE ASPARTATE MONOHYDRATE, DEXTROAMPHETAMINE SULFATE AND AMPHETAMINE SULFATE 2.5; 2.5; 2.5; 2.5 MG/1; MG/1; MG/1; MG/1
10 CAPSULE, EXTENDED RELEASE ORAL DAILY
Qty: 30 CAPSULE | Refills: 0 | Status: SHIPPED | OUTPATIENT
Start: 2021-03-25 | End: 2021-04-24

## 2021-04-26 ENCOUNTER — MYC REFILL (OUTPATIENT)
Dept: FAMILY MEDICINE | Facility: OTHER | Age: 26
End: 2021-04-26

## 2021-04-26 DIAGNOSIS — F90.0 ATTENTION DEFICIT HYPERACTIVITY DISORDER (ADHD), PREDOMINANTLY INATTENTIVE TYPE: ICD-10-CM

## 2021-04-26 RX ORDER — DEXTROAMPHETAMINE SACCHARATE, AMPHETAMINE ASPARTATE MONOHYDRATE, DEXTROAMPHETAMINE SULFATE AND AMPHETAMINE SULFATE 2.5; 2.5; 2.5; 2.5 MG/1; MG/1; MG/1; MG/1
10 CAPSULE, EXTENDED RELEASE ORAL DAILY
Qty: 30 CAPSULE | Refills: 0 | OUTPATIENT
Start: 2021-04-26

## 2021-04-26 NOTE — TELEPHONE ENCOUNTER
Pending Prescriptions:                       Disp   Refills    amphetamine-dextroamphetamine (ADDERALL XR*30 cap*0        Sig: Take 1 capsule (10 mg) by mouth daily    Routing refill request to provider for review/approval because:  Drug not on the [facility] refill protocol

## 2021-05-03 ENCOUNTER — NURSE TRIAGE (OUTPATIENT)
Dept: NURSING | Facility: CLINIC | Age: 26
End: 2021-05-03

## 2021-05-03 NOTE — TELEPHONE ENCOUNTER
Joelle is using [employer] [facility] to refill prescription a couple of days ago and is wondering what the status is on the medication.  Joelle is requesting a refill for Adderall 10mg.  Joelle is all out of her Adderall and next prescription is not due to fill until 5/26/2021.  Most recent bottle that she has at home is dated 03/27/2021.  Please phone Joelle.       COVID 19 Nurse Triage Plan/Patient Instructions    Please be aware that novel coronavirus (COVID-19) may be circulating in the community. If you develop symptoms such as fever, cough, or SOB or if you have concerns about the presence of another infection including coronavirus (COVID-19), please contact your health care provider or visit [email or web address].     Disposition/Instructions    Home care recommended. Follow home care protocol based instructions.    Thank you for taking steps to prevent the spread of this virus.  o Limit your contact with others.  o Wear a simple mask to cover your cough.  o Wash your hands well and often.    Resources    [facility]: About COVID-19: [email or web address]    CDC: What to Do If You're Sick: www.cdc.gov/coronavirus/2019-ncov/about/steps-when-sick.html    CDC: Ending Home Isolation: www.cdc.gov/coronavirus/2019-ncov/hcp/disposition-in-home-patients.html     CDC: Caring for Someone: www.cdc.gov/coronavirus/2019-ncov/if-you-are-sick/care-for-someone.html     [facility]: Interim Guidance for Hospital Discharge to Home: www.health.[facility]/diseases/coronavirus/hcp/hospdischarge.pdf    [facility] clinical trials (COVID-19 research studies): [email or web address]-trials     Below are the COVID-19 hotlines at the [place] Department of [facility] ([facility]). Interpreters are available.   o For health questions: Call 796-327-4723 or 1-712.318.8782 (7 a.m. to 7 p.m.)  o For questions about schools and childcare: Call 179-158-7422 or 1-364.507.6664 (7 a.m. to 7 p.m.)                        Reason  for Disposition    Caller requesting a NON-URGENT new prescription or refill and triager unable to refill per department policy    Additional Information    Negative: MORE THAN A DOUBLE DOSE of a prescription or over-the-counter (OTC) drug    Negative: DOUBLE DOSE (an extra dose or lesser amount) of over-the-counter (OTC) drug and any symptoms (e.g., dizziness, nausea, pain, sleepiness)    Negative: DOUBLE DOSE (an extra dose or lesser amount) of prescription drug and any symptoms (e.g., dizziness, nausea, pain, sleepiness)    Negative: Took another person's prescription drug    Negative: DOUBLE DOSE (an extra dose or lesser amount) of prescription drug and NO symptoms (Exception: a double dose of antibiotics)    Negative: Diabetes drug error or overdose (e.g., took wrong type of insulin or took extra dose)    Negative: Caller has medication question about med not prescribed by PCP and triager unable to answer question (e.g., compatibility with other med, storage)    Negative: Request for URGENT new prescription or refill of 'essential' medication (i.e., likelihood of harm to patient if not taken) and triager unable to fill per department policy    Negative: Prescription not at pharmacy and was prescribed today by PCP    Negative: Pharmacy calling with prescription questions and triager unable to answer question    Negative: Caller has urgent medication question about med that PCP prescribed and triager unable to answer question    Negative: Caller has NON-URGENT medication question about med that PCP prescribed and triager unable to answer question    Protocols used: MEDICATION QUESTION CALL-A-[place]

## 2021-05-03 NOTE — TELEPHONE ENCOUNTER
Patient should have a refill on her medication. Please follow up with the patient. Please also remind the patient she is due for a follow up around 06/25/21.     Donnell Levine RN, BSN  [facility]  May 3, 2021

## 2021-06-04 ENCOUNTER — MYC REFILL (OUTPATIENT)
Dept: FAMILY MEDICINE | Facility: OTHER | Age: 26
End: 2021-06-04

## 2021-06-04 DIAGNOSIS — F90.0 ATTENTION DEFICIT HYPERACTIVITY DISORDER (ADHD), PREDOMINANTLY INATTENTIVE TYPE: ICD-10-CM

## 2021-06-04 RX ORDER — DEXTROAMPHETAMINE SACCHARATE, AMPHETAMINE ASPARTATE MONOHYDRATE, DEXTROAMPHETAMINE SULFATE AND AMPHETAMINE SULFATE 2.5; 2.5; 2.5; 2.5 MG/1; MG/1; MG/1; MG/1
10 CAPSULE, EXTENDED RELEASE ORAL DAILY
Qty: 30 CAPSULE | Refills: 0 | Status: CANCELLED | OUTPATIENT
Start: 2021-06-04

## 2021-07-15 ENCOUNTER — MYC REFILL (OUTPATIENT)
Dept: FAMILY MEDICINE | Facility: OTHER | Age: 26
End: 2021-07-15

## 2021-07-15 DIAGNOSIS — F90.0 ATTENTION DEFICIT HYPERACTIVITY DISORDER (ADHD), PREDOMINANTLY INATTENTIVE TYPE: ICD-10-CM

## 2021-07-16 RX ORDER — DEXTROAMPHETAMINE SACCHARATE, AMPHETAMINE ASPARTATE MONOHYDRATE, DEXTROAMPHETAMINE SULFATE AND AMPHETAMINE SULFATE 2.5; 2.5; 2.5; 2.5 MG/1; MG/1; MG/1; MG/1
10 CAPSULE, EXTENDED RELEASE ORAL DAILY
Qty: 30 CAPSULE | Refills: 0 | Status: SHIPPED | OUTPATIENT
Start: 2021-07-16 | End: 2021-12-10

## 2021-07-16 RX ORDER — DEXTROAMPHETAMINE SACCHARATE, AMPHETAMINE ASPARTATE MONOHYDRATE, DEXTROAMPHETAMINE SULFATE AND AMPHETAMINE SULFATE 2.5; 2.5; 2.5; 2.5 MG/1; MG/1; MG/1; MG/1
10 CAPSULE, EXTENDED RELEASE ORAL DAILY
Qty: 30 CAPSULE | Refills: 0 | OUTPATIENT
Start: 2021-07-16

## 2021-07-16 NOTE — TELEPHONE ENCOUNTER
Scheduled patient on 8/11/21 for 1st available in clinic appointment.  Patient has 3 days left for Adderall.  Is it possible to get a refill till that appointment or have a virtual appointment?  Informed patient will route to AE to review.  Nikki Kaur CMA ([facility]AMA)

## 2021-08-11 ENCOUNTER — OFFICE VISIT (OUTPATIENT)
Dept: FAMILY MEDICINE | Facility: OTHER | Age: 26
End: 2021-08-11
Payer: COMMERCIAL

## 2021-08-11 VITALS
BODY MASS INDEX: 19.4 KG/M2 | TEMPERATURE: 97.5 F | WEIGHT: 128 LBS | HEART RATE: 86 BPM | HEIGHT: 68 IN | SYSTOLIC BLOOD PRESSURE: 102 MMHG | DIASTOLIC BLOOD PRESSURE: 62 MMHG | OXYGEN SATURATION: 100 % | RESPIRATION RATE: 16 BRPM

## 2021-08-11 DIAGNOSIS — Z11.59 NEED FOR HEPATITIS C SCREENING TEST: ICD-10-CM

## 2021-08-11 DIAGNOSIS — F41.1 GENERALIZED ANXIETY DISORDER: ICD-10-CM

## 2021-08-11 DIAGNOSIS — F90.0 ATTENTION DEFICIT HYPERACTIVITY DISORDER (ADHD), PREDOMINANTLY INATTENTIVE TYPE: Primary | ICD-10-CM

## 2021-08-11 LAB
AMPHETAMINES UR QL: DETECTED
BARBITURATES UR QL SCN: NOT DETECTED
BENZODIAZ UR QL SCN: NOT DETECTED
BUPRENORPHINE UR QL: NOT DETECTED
CANNABINOIDS UR QL: NOT DETECTED
COCAINE UR QL SCN: NOT DETECTED
D-METHAMPHET UR QL: NOT DETECTED
HCV AB SERPL QL IA: NONREACTIVE
METHADONE UR QL SCN: NOT DETECTED
OPIATES UR QL SCN: NOT DETECTED
OXYCODONE UR QL SCN: NOT DETECTED
PCP UR QL SCN: NOT DETECTED
PROPOXYPH UR QL: NOT DETECTED
TRICYCLICS UR QL SCN: NOT DETECTED

## 2021-08-11 PROCEDURE — 86803 HEPATITIS C AB TEST: CPT | Performed by: FAMILY MEDICINE

## 2021-08-11 PROCEDURE — 36415 COLL VENOUS BLD VENIPUNCTURE: CPT | Performed by: FAMILY MEDICINE

## 2021-08-11 PROCEDURE — 99214 OFFICE O/P EST MOD 30 MIN: CPT | Performed by: FAMILY MEDICINE

## 2021-08-11 PROCEDURE — 80306 DRUG TEST PRSMV INSTRMNT: CPT | Performed by: FAMILY MEDICINE

## 2021-08-11 RX ORDER — DEXTROAMPHETAMINE SACCHARATE, AMPHETAMINE ASPARTATE MONOHYDRATE, DEXTROAMPHETAMINE SULFATE AND AMPHETAMINE SULFATE 2.5; 2.5; 2.5; 2.5 MG/1; MG/1; MG/1; MG/1
10 CAPSULE, EXTENDED RELEASE ORAL DAILY
Qty: 30 CAPSULE | Refills: 0 | Status: SHIPPED | OUTPATIENT
Start: 2021-08-11 | End: 2021-09-10

## 2021-08-11 RX ORDER — DEXTROAMPHETAMINE SACCHARATE, AMPHETAMINE ASPARTATE MONOHYDRATE, DEXTROAMPHETAMINE SULFATE AND AMPHETAMINE SULFATE 2.5; 2.5; 2.5; 2.5 MG/1; MG/1; MG/1; MG/1
10 CAPSULE, EXTENDED RELEASE ORAL DAILY
Qty: 30 CAPSULE | Refills: 0 | Status: SHIPPED | OUTPATIENT
Start: 2021-09-11 | End: 2021-10-11

## 2021-08-11 RX ORDER — DEXTROAMPHETAMINE SACCHARATE, AMPHETAMINE ASPARTATE MONOHYDRATE, DEXTROAMPHETAMINE SULFATE AND AMPHETAMINE SULFATE 2.5; 2.5; 2.5; 2.5 MG/1; MG/1; MG/1; MG/1
10 CAPSULE, EXTENDED RELEASE ORAL DAILY
Qty: 30 CAPSULE | Refills: 0 | Status: SHIPPED | OUTPATIENT
Start: 2021-10-12 | End: 2021-11-11

## 2021-08-11 ASSESSMENT — PAIN SCALES - GENERAL: PAINLEVEL: NO PAIN (0)

## 2021-08-11 ASSESSMENT — MIFFLIN-ST. JEOR: SCORE: 1357.1

## 2021-09-18 ENCOUNTER — MYC REFILL (OUTPATIENT)
Dept: FAMILY MEDICINE | Facility: OTHER | Age: 26
End: 2021-09-18

## 2021-09-18 DIAGNOSIS — F90.0 ATTENTION DEFICIT HYPERACTIVITY DISORDER (ADHD), PREDOMINANTLY INATTENTIVE TYPE: ICD-10-CM

## 2021-09-20 RX ORDER — DEXTROAMPHETAMINE SACCHARATE, AMPHETAMINE ASPARTATE MONOHYDRATE, DEXTROAMPHETAMINE SULFATE AND AMPHETAMINE SULFATE 2.5; 2.5; 2.5; 2.5 MG/1; MG/1; MG/1; MG/1
10 CAPSULE, EXTENDED RELEASE ORAL DAILY
Qty: 30 CAPSULE | Refills: 0 | OUTPATIENT
Start: 2021-09-20

## 2021-10-10 ENCOUNTER — HEALTH MAINTENANCE LETTER (OUTPATIENT)
Age: 26
End: 2021-10-10

## 2021-12-09 ENCOUNTER — TELEPHONE (OUTPATIENT)
Dept: FAMILY MEDICINE | Facility: OTHER | Age: 26
End: 2021-12-09
Payer: COMMERCIAL

## 2021-12-09 DIAGNOSIS — F90.0 ATTENTION DEFICIT HYPERACTIVITY DISORDER (ADHD), PREDOMINANTLY INATTENTIVE TYPE: ICD-10-CM

## 2021-12-09 NOTE — TELEPHONE ENCOUNTER
Reason for Call:  Medication or medication refill:    Do you use a [facility]?  Name of the pharmacy and phone number for the current request:    [id number] [address] AT [facility] [address]  Name of the medication requested: amphetamine-dextroamphetamine (ADDERALL XR) 10 MG 24 hr capsule    Other request: Patient has a med check appointment with Dr. Szymanski 01/12/22, but will not have enough prescription to get by till then. Patient is requesting for enough medication till her next appointment    Can we leave a detailed message on this number? YES    Phone number patient can be reached at: Home number on file [phone] (home)    Best Time: anytime    Call taken on 12/9/2021 at 9:55 AM by Debbie Hilton

## 2021-12-10 RX ORDER — DEXTROAMPHETAMINE SACCHARATE, AMPHETAMINE ASPARTATE MONOHYDRATE, DEXTROAMPHETAMINE SULFATE AND AMPHETAMINE SULFATE 2.5; 2.5; 2.5; 2.5 MG/1; MG/1; MG/1; MG/1
10 CAPSULE, EXTENDED RELEASE ORAL DAILY
Qty: 30 CAPSULE | Refills: 0 | Status: SHIPPED | OUTPATIENT
Start: 2021-12-10 | End: 2022-01-12

## 2022-01-12 ENCOUNTER — OFFICE VISIT (OUTPATIENT)
Dept: FAMILY MEDICINE | Facility: OTHER | Age: 27
End: 2022-01-12
Payer: COMMERCIAL

## 2022-01-12 VITALS
BODY MASS INDEX: 18.37 KG/M2 | SYSTOLIC BLOOD PRESSURE: 102 MMHG | DIASTOLIC BLOOD PRESSURE: 64 MMHG | OXYGEN SATURATION: 96 % | WEIGHT: 124 LBS | HEIGHT: 69 IN | HEART RATE: 102 BPM | RESPIRATION RATE: 16 BRPM | TEMPERATURE: 98.8 F

## 2022-01-12 DIAGNOSIS — Z23 HIGH PRIORITY FOR 2019-NCOV VACCINE: ICD-10-CM

## 2022-01-12 DIAGNOSIS — Z23 NEED FOR PROPHYLACTIC VACCINATION AND INOCULATION AGAINST INFLUENZA: ICD-10-CM

## 2022-01-12 DIAGNOSIS — F90.0 ATTENTION DEFICIT HYPERACTIVITY DISORDER (ADHD), PREDOMINANTLY INATTENTIVE TYPE: Primary | ICD-10-CM

## 2022-01-12 PROCEDURE — 0054A COVID-19,PF,PFIZER (12+ YRS): CPT | Performed by: FAMILY MEDICINE

## 2022-01-12 PROCEDURE — 90471 IMMUNIZATION ADMIN: CPT | Performed by: FAMILY MEDICINE

## 2022-01-12 PROCEDURE — 99213 OFFICE O/P EST LOW 20 MIN: CPT | Mod: 25 | Performed by: FAMILY MEDICINE

## 2022-01-12 PROCEDURE — 90686 IIV4 VACC NO PRSV 0.5 ML IM: CPT | Performed by: FAMILY MEDICINE

## 2022-01-12 PROCEDURE — 91305 COVID-19,PF,PFIZER (12+ YRS): CPT | Performed by: FAMILY MEDICINE

## 2022-01-12 RX ORDER — DEXTROAMPHETAMINE SACCHARATE, AMPHETAMINE ASPARTATE MONOHYDRATE, DEXTROAMPHETAMINE SULFATE AND AMPHETAMINE SULFATE 2.5; 2.5; 2.5; 2.5 MG/1; MG/1; MG/1; MG/1
10 CAPSULE, EXTENDED RELEASE ORAL DAILY
Qty: 30 CAPSULE | Refills: 0 | Status: CANCELLED | OUTPATIENT
Start: 2022-01-12

## 2022-01-12 RX ORDER — DEXTROAMPHETAMINE SACCHARATE, AMPHETAMINE ASPARTATE MONOHYDRATE, DEXTROAMPHETAMINE SULFATE AND AMPHETAMINE SULFATE 2.5; 2.5; 2.5; 2.5 MG/1; MG/1; MG/1; MG/1
10 CAPSULE, EXTENDED RELEASE ORAL DAILY
Qty: 30 CAPSULE | Refills: 0 | Status: SHIPPED | OUTPATIENT
Start: 2022-01-12 | End: 2022-02-11

## 2022-01-12 RX ORDER — DEXTROAMPHETAMINE SACCHARATE, AMPHETAMINE ASPARTATE MONOHYDRATE, DEXTROAMPHETAMINE SULFATE AND AMPHETAMINE SULFATE 2.5; 2.5; 2.5; 2.5 MG/1; MG/1; MG/1; MG/1
10 CAPSULE, EXTENDED RELEASE ORAL DAILY
Qty: 30 CAPSULE | Refills: 0 | Status: SHIPPED | OUTPATIENT
Start: 2022-02-12 | End: 2022-03-14

## 2022-01-12 RX ORDER — DEXTROAMPHETAMINE SACCHARATE, AMPHETAMINE ASPARTATE MONOHYDRATE, DEXTROAMPHETAMINE SULFATE AND AMPHETAMINE SULFATE 2.5; 2.5; 2.5; 2.5 MG/1; MG/1; MG/1; MG/1
10 CAPSULE, EXTENDED RELEASE ORAL DAILY
Qty: 30 CAPSULE | Refills: 0 | Status: SHIPPED | OUTPATIENT
Start: 2022-03-15 | End: 2022-04-14

## 2022-01-12 ASSESSMENT — PAIN SCALES - GENERAL: PAINLEVEL: NO PAIN (0)

## 2022-01-12 ASSESSMENT — MIFFLIN-ST. JEOR: SCORE: 1350.83

## 2022-01-12 NOTE — LETTER
[facility] KODAK Moran  01/12/22  Patient: Joelle De La Torre  YOB: 1995  Medical Record Number: [id number]                                                                                  Non-Opioid Controlled Substance Agreement    This is an agreement between you and your provider regarding safe and appropriate use of controlled substances prescribed by your care team. Controlled substances are?medicines that can cause physical and mental dependence (abuse).     There are strict laws about having and using these medicines. We here at [facility] are  committed to working with you in your efforts to get better. To support you in this work, we'll help you schedule regular office appointments for medicine refills. If we must cancel or change your appointment for any reason, we'll make sure you have enough medicine to last until your next appointment.     As a Provider, I will:     Listen carefully to your concerns while treating you with respect.     Recommend a treatment plan that I believe is in your best interest and may involve therapies other than medicine.      Talk with you often about the possible benefits and the risk of harm of any medicine that we prescribe for you.    Assess the safety of this medicine and check how well it works.      Provide a plan on how to taper (discontinue or go off) using this medicine if the decision is made to stop its use.      ::  As a Patient, I understand controlled substances:       Are prescribed by my care provider to help me function or work and manage my condition(s).?    Are strong medicines and can cause serious side effects.       Need to be taken exactly as prescribed.?Combining controlled substances with certain medicines or chemicals (such as illegal drugs, alcohol, sedatives, sleeping pills, and benzodiazepines) can be dangerous or even fatal.? If I stop taking my medicines suddenly, I may have severe withdrawal symptoms.     The  risks, benefits, and side effects of these medicine(s) were explained to me. I agree that:    1. I will take part in other treatments as advised by my care team. This may be psychiatry or counseling, physical therapy, behavioral therapy, group treatment or a referral to specialist.    2. I will keep all my appointments and understand this is part of the monitoring of controlled substances.?My care team may require an office visit for EVERY controlled substance refill. If I miss appointments or don t follow instructions, my care team may stop my medicine    3. I will take my medicines as prescribed. I will not change the dose or schedule unless my care team tells me to. There will be no refills if I run out early.      4. I may be asked to come to the clinic and complete a urine drug test or complete a pill count. If I don t give a urine sample or participate in a pill count, the care team may stop my medicine.    5. I will only receive controlled substance prescriptions from this clinic. If I am treated by another provider, I will tell them that I am taking controlled substances and that I have a treatment agreement with this provider. I will inform my [facility] care team within one business day if I am given a prescription for any controlled substance by another healthcare provider. My [facility] care team can contact other providers and pharmacists about my use of any medicines.    6. It is up to me to make sure that I don't run out of my medicines on weekends or holidays.?If my care team is willing to refill my prescription without a visit, I must request refills only during office hours. Refills may take up to 3 business days to process. I will use one pharmacy to fill all my controlled substance prescriptions. I will notify the clinic about any changes to my insurance or medicine availability.    7. I am responsible for my prescriptions. If the medicine/prescription is lost, stolen or destroyed,  it will not be replaced.?I also agree not to share controlled substance medicines with anyone.     8. I am aware I should not use any illegal or recreational drugs. I agree not to drink alcohol unless my care team says I can.     9. If I enroll in the [place] Medical Cannabis program, I will tell my care team before my next refill.    10. I will tell my care team right away if I become pregnant, have a new medical problem treated outside of my regular clinic, or have a change in my medicines.     11. I understand that this medicine can affect my thinking, judgment and reaction time.? Alcohol and drugs affect the brain and body, which can affect the safety of my driving. Being under the influence of alcohol or drugs can affect my decision-making, behaviors, personal safety and the safety of others. Driving while impaired (DWI) can occur if a person is driving, operating or in physical control of a car, motorcycle, boat, snowmobile, ATV, motorbike, off-road vehicle or any other motor vehicle ([place] Statute 169A.20). I understand the risk if I choose to drive or operate any vehicle or machinery.    I understand that if I do not follow any of the conditions above, my prescriptions or treatment may be stopped or changed.   I agree that my provider, clinic care team and pharmacy may work with any city, state or federal law enforcement agency that investigates the misuse, sale or other diversion of my controlled medicine. I will allow my provider to discuss my care with, or share a copy of, this agreement with any other treating provider, pharmacy or emergency room where I receive care.     I have read this agreement and have asked questions about anything I did not understand.    ________________________________________________________  Patient Signature - Joelle De La Torre     ___________________                   Date     ________________________________________________________  Provider Signature - Nicole Szymanski MD, MD        ___________________                   Date     ________________________________________________________  Witness Signature (required if provider not present while patient signing)          ___________________                   Date

## 2022-01-12 NOTE — PROGRESS NOTES
"SUBJECTIVE:  Joelle is here today to recheck ADHD/ADD.    Updates since last visit: Good     Routine for taking medicine, including time: 7am  Time medicine wears off: 400-500pm  Issues at school: N/A  Issues at home: None  Control of symptoms: Yes    Side effects:  Headaches: No  Stomach aches: No  Irritability/mood swings: No  Difficulties with sleep: No  Social withdrawal: Yes   Unusual movements/tics: No  Decreased appetite: Yes     Other concerns: None    Patient Active Problem List   Diagnosis     Anxiety disorder     Attention deficit hyperactivity disorder (ADHD)     Dysmenorrhea     Dermatographia       History reviewed. No pertinent past medical history.    History reviewed. No pertinent surgical history.    Current Outpatient Medications   Medication     amphetamine-dextroamphetamine (ADDERALL XR) 10 MG 24 hr capsule     [START ON 2/12/2022] amphetamine-dextroamphetamine (ADDERALL XR) 10 MG 24 hr capsule     [START ON 3/15/2022] amphetamine-dextroamphetamine (ADDERALL XR) 10 MG 24 hr capsule     No current facility-administered medications for this visit.       OBJECTIVE:  /64   Pulse 102   Temp 98.8  F (37.1  C) (Temporal)   Resp 16   Ht 1.743 m (5' 8.62\")   Wt 56.2 kg (124 lb)   LMP 12/27/2021 (Exact Date)   SpO2 96%   Breastfeeding No   BMI 18.51 kg/m    Gen: alert, awake, NAD  Lungs: CTA jerry  Heart: RR without murmur  Psych: mood doing well.      ASSESSMENT:  1. Attention deficit hyperactivity disorder (ADHD), predominantly inattentive type    2. High priority for 2019-nCoV vaccine    3. Need for prophylactic vaccination and inoculation against influenza        PLAN:  Doing well with ADHD control and other than 10 lbs of weight loss since fall, she is doing well. Discussed that we should be working on getting her a little more for breakfast to maintain her and follow-up 3 months and may need to revisit med plan if she continues to drop as she is on the low end for BMI already.  28 min " spent on the date of the encounter in chart review, patient visit, review of tests, documentation and/or discussion with other providers about the issues documented above.       Return in about 3 months (around 4/12/2022).      Nicole Szymanski MD

## 2022-01-20 ENCOUNTER — DOCUMENTATION ONLY (OUTPATIENT)
Dept: OTHER | Facility: CLINIC | Age: 27
End: 2022-01-20
Payer: COMMERCIAL

## 2022-04-13 ENCOUNTER — OFFICE VISIT (OUTPATIENT)
Dept: FAMILY MEDICINE | Facility: OTHER | Age: 27
End: 2022-04-13
Payer: COMMERCIAL

## 2022-04-13 VITALS
HEIGHT: 69 IN | OXYGEN SATURATION: 100 % | HEART RATE: 115 BPM | SYSTOLIC BLOOD PRESSURE: 100 MMHG | BODY MASS INDEX: 19.33 KG/M2 | TEMPERATURE: 97.6 F | WEIGHT: 130.5 LBS | RESPIRATION RATE: 24 BRPM | DIASTOLIC BLOOD PRESSURE: 60 MMHG

## 2022-04-13 DIAGNOSIS — F41.1 GENERALIZED ANXIETY DISORDER: ICD-10-CM

## 2022-04-13 DIAGNOSIS — Z00.00 ROUTINE GENERAL MEDICAL EXAMINATION AT A HEALTH CARE FACILITY: Primary | ICD-10-CM

## 2022-04-13 DIAGNOSIS — F90.0 ATTENTION DEFICIT HYPERACTIVITY DISORDER (ADHD), PREDOMINANTLY INATTENTIVE TYPE: ICD-10-CM

## 2022-04-13 PROCEDURE — 99213 OFFICE O/P EST LOW 20 MIN: CPT | Mod: 25 | Performed by: FAMILY MEDICINE

## 2022-04-13 PROCEDURE — 99395 PREV VISIT EST AGE 18-39: CPT | Performed by: FAMILY MEDICINE

## 2022-04-13 RX ORDER — DEXTROAMPHETAMINE SACCHARATE, AMPHETAMINE ASPARTATE MONOHYDRATE, DEXTROAMPHETAMINE SULFATE AND AMPHETAMINE SULFATE 2.5; 2.5; 2.5; 2.5 MG/1; MG/1; MG/1; MG/1
10 CAPSULE, EXTENDED RELEASE ORAL DAILY
Qty: 30 CAPSULE | Refills: 0 | Status: SHIPPED | OUTPATIENT
Start: 2022-04-13 | End: 2022-05-13

## 2022-04-13 RX ORDER — DEXTROAMPHETAMINE SACCHARATE, AMPHETAMINE ASPARTATE MONOHYDRATE, DEXTROAMPHETAMINE SULFATE AND AMPHETAMINE SULFATE 2.5; 2.5; 2.5; 2.5 MG/1; MG/1; MG/1; MG/1
10 CAPSULE, EXTENDED RELEASE ORAL DAILY
Qty: 30 CAPSULE | Refills: 0 | Status: SHIPPED | OUTPATIENT
Start: 2022-05-14 | End: 2022-06-13

## 2022-04-13 RX ORDER — DEXTROAMPHETAMINE SACCHARATE, AMPHETAMINE ASPARTATE MONOHYDRATE, DEXTROAMPHETAMINE SULFATE AND AMPHETAMINE SULFATE 2.5; 2.5; 2.5; 2.5 MG/1; MG/1; MG/1; MG/1
10 CAPSULE, EXTENDED RELEASE ORAL DAILY
Qty: 30 CAPSULE | Refills: 0 | Status: SHIPPED | OUTPATIENT
Start: 2022-06-14 | End: 2022-07-14

## 2022-04-13 ASSESSMENT — ANXIETY QUESTIONNAIRES
GAD7 TOTAL SCORE: 3
2. NOT BEING ABLE TO STOP OR CONTROL WORRYING: SEVERAL DAYS
4. TROUBLE RELAXING: NOT AT ALL
3. WORRYING TOO MUCH ABOUT DIFFERENT THINGS: SEVERAL DAYS
7. FEELING AFRAID AS IF SOMETHING AWFUL MIGHT HAPPEN: NOT AT ALL
1. FEELING NERVOUS, ANXIOUS, OR ON EDGE: SEVERAL DAYS
7. FEELING AFRAID AS IF SOMETHING AWFUL MIGHT HAPPEN: NOT AT ALL
GAD7 TOTAL SCORE: 3
6. BECOMING EASILY ANNOYED OR IRRITABLE: NOT AT ALL
5. BEING SO RESTLESS THAT IT IS HARD TO SIT STILL: NOT AT ALL

## 2022-04-13 ASSESSMENT — ENCOUNTER SYMPTOMS
ABDOMINAL PAIN: 0
HEMATOCHEZIA: 0
PALPITATIONS: 0
DYSURIA: 0
FEVER: 0
WEAKNESS: 0
DIZZINESS: 0
FREQUENCY: 0
DIARRHEA: 0
ARTHRALGIAS: 0
PARESTHESIAS: 0
HEADACHES: 0
SORE THROAT: 0
JOINT SWELLING: 0
NAUSEA: 0
CHILLS: 0
HEMATURIA: 0
COUGH: 0
MYALGIAS: 0
EYE PAIN: 0
CONSTIPATION: 0
NERVOUS/ANXIOUS: 0
SHORTNESS OF BREATH: 0
HEARTBURN: 0
BREAST MASS: 0

## 2022-04-13 NOTE — PROGRESS NOTES
SEe other encounter.    Subjective   Joelle is a 26 year old who presents for the following health issues     HPI     SUBJECTIVE:  Joelle is here today to recheck ADHD/ADD.    Updates since last visit: Good     Routine for taking medicine, including time: 6am  Time medicine wears off: 3/4pm  Issues at school: none  Issues at home: none  Control of symptoms: yes    Side effects:  Headaches: No  Stomach aches: No  Irritability/mood swings: No  Difficulties with sleep: No  Social withdrawal: Yes still socializing well, though is not concerned about small change  Unusual movements/tics: No  Decreased appetite: Yes though maintaining through adding muscle resistance which increased appetite.    Other concerns: none

## 2022-04-13 NOTE — PROGRESS NOTES
SUBJECTIVE:   CC: Joelle De La Torre is an 26 year old woman who presents for preventive health visit.       Patient has been advised of split billing requirements and indicates understanding: Yes  Healthy Habits:     Getting at least 3 servings of Calcium per day:  Yes    Bi-annual eye exam:  NO    Dental care twice a year:  Yes    Sleep apnea or symptoms of sleep apnea:  None    Diet:  Regular (no restrictions)    Frequency of exercise:  2-3 days/week    Duration of exercise:  15-30 minutes    Taking medications regularly:  Yes    Medication side effects:  None    PHQ-2 Total Score: 2    Additional concerns today:  No              Today's PHQ-2 Score:   PHQ-2 ( 1999 [employer]) 4/13/2022   Q1: Little interest or pleasure in doing things 1   Q2: Feeling down, depressed or hopeless 1   PHQ-2 Score 2   PHQ-2 Total Score (12-17 Years)- Positive if 3 or more points; Administer PHQ-A if positive -   Q1: Little interest or pleasure in doing things Several days   Q2: Feeling down, depressed or hopeless Several days   PHQ-2 Score 2       Abuse: Current or Past (Physical, Sexual or Emotional) - No  Do you feel safe in your environment? Yes      Social History     Tobacco Use     Smoking status: Never Smoker     Smokeless tobacco: Never Used   Substance Use Topics     Alcohol use: Yes     Comment: 1 a drink a day     If you drink alcohol do you typically have >3 drinks per day or >7 drinks per week? No    Alcohol Use 4/13/2022   Prescreen: >3 drinks/day or >7 drinks/week? No   Prescreen: >3 drinks/day or >7 drinks/week? -       Reviewed orders with patient.  Reviewed health maintenance and updated orders accordingly - Yes  Lab work is in process    Breast Cancer Screening:    Breast CA Risk Assessment (FHS-7) 4/13/2022   Do you have a family history of breast, colon, or ovarian cancer? No / Unknown         Patient under 40 years of age: Routine Mammogram Screening not recommended.   Pertinent mammograms are reviewed under the  "imaging tab.    History of abnormal Pap smear: NO - age 21-29 PAP every 3 years recommended  PAP / HPV 8/20/2020   PAP (Historical) NIL     Reviewed and updated as needed this visit by clinical staff   Tobacco  Allergies  Meds  Problems  Med Hx  Surg Hx  Fam Hx  Soc   Hx          Reviewed and updated as needed this visit by Provider   Tobacco  Allergies  Meds  Problems  Med Hx  Surg Hx  Fam Hx               Review of Systems   Constitutional: Negative for chills and fever.   HENT: Negative for congestion, ear pain, hearing loss and sore throat.    Eyes: Negative for pain and visual disturbance.   Respiratory: Negative for cough and shortness of breath.    Cardiovascular: Negative for chest pain, palpitations and peripheral edema.   Gastrointestinal: Negative for abdominal pain, constipation, diarrhea, heartburn, hematochezia and nausea.   Breasts:  Negative for tenderness, breast mass and discharge.   Genitourinary: Negative for dysuria, frequency, genital sores, hematuria, pelvic pain, urgency, vaginal bleeding and vaginal discharge.   Musculoskeletal: Negative for arthralgias, joint swelling and myalgias.   Skin: Negative for rash.   Neurological: Negative for dizziness, weakness, headaches and paresthesias.   Psychiatric/Behavioral: Negative for mood changes. The patient is not nervous/anxious.           OBJECTIVE:   /60   Pulse 115   Temp 97.6  F (36.4  C) (Temporal)   Resp 24   Ht 1.76 m (5' 9.29\")   Wt 59.2 kg (130 lb 8 oz)   LMP 03/30/2022 (Approximate)   SpO2 100%   Breastfeeding No   BMI 19.11 kg/m    Physical Exam  GENERAL: healthy, alert and no distress  EYES: Eyes grossly normal to inspection, PERRL and conjunctivae and sclerae normal  HENT: ear canals and TM's normal, nose and mouth without ulcers or lesions  NECK: no adenopathy, no asymmetry, masses, or scars and thyroid normal to palpation  RESP: lungs clear to auscultation - no rales, rhonchi or wheezes  BREAST: normal " "without masses, tenderness or nipple discharge and no palpable axillary masses or adenopathy  CV: regular rate and rhythm, normal S1 S2, no S3 or S4, no murmur, click or rub, no peripheral edema and peripheral pulses strong  ABDOMEN: soft, nontender, no hepatosplenomegaly, no masses and bowel sounds normal  MS: no gross musculoskeletal defects noted, no edema  SKIN: no suspicious lesions or rashes  NEURO: Normal strength and tone, mentation intact and speech normal  PSYCH: mentation appears normal, affect normal/bright        ASSESSMENT/PLAN:       ICD-10-CM    1. Routine general medical examination at a health care facility  Z00.00    2. Attention deficit hyperactivity disorder (ADHD), predominantly inattentive type  F90.0 amphetamine-dextroamphetamine (ADDERALL XR) 10 MG 24 hr capsule     amphetamine-dextroamphetamine (ADDERALL XR) 10 MG 24 hr capsule     amphetamine-dextroamphetamine (ADDERALL XR) 10 MG 24 hr capsule   3. Generalized anxiety disorder  F41.1      Patient is doing well with her current dose of ADHD medications and it has not exacerbated her anxiety.  She would like to continue these medications at this time and 3-month supply was given with the plan for 3-month follow-up.  We did offer her baseline cholesterol laboratory though she is not interested at this time and feels like she is staying relatively healthy which she appears to be on all of her measures        COUNSELING:  Reviewed preventive health counseling, as reflected in patient instructions       Regular exercise       Healthy diet/nutrition    Estimated body mass index is 19.11 kg/m  as calculated from the following:    Height as of this encounter: 1.76 m (5' 9.29\").    Weight as of this encounter: 59.2 kg (130 lb 8 oz).    Weight management plan improving weight gain as she has added muscle resistance    She reports that she has never smoked. She has never used smokeless tobacco.      Counseling Resources:  ATP IV Guidelines  Pooled " Cohorts Equation Calculator  Breast Cancer Risk Calculator  BRCA-Related Cancer Risk Assessment: FHS-7 Tool  FRAX Risk Assessment  ICSI Preventive Guidelines  Dietary Guidelines for Americans, 2010  [employer]'s MyPlate  ASA Prophylaxis  Lung CA Screening    Nicole Szymanski MD, MD  [facility]  Answers for HPI/ROS submitted by the patient on 4/13/2022  DAYSI 7 TOTAL SCORE: 3

## 2022-04-14 ASSESSMENT — ANXIETY QUESTIONNAIRES: GAD7 TOTAL SCORE: 3

## 2022-09-18 ENCOUNTER — HEALTH MAINTENANCE LETTER (OUTPATIENT)
Age: 27
End: 2022-09-18

## 2023-04-20 ENCOUNTER — PATIENT OUTREACH (OUTPATIENT)
Dept: CARE COORDINATION | Facility: CLINIC | Age: 28
End: 2023-04-20
Payer: COMMERCIAL

## 2023-06-04 ENCOUNTER — HEALTH MAINTENANCE LETTER (OUTPATIENT)
Age: 28
End: 2023-06-04

## 2023-09-21 ENCOUNTER — OFFICE VISIT (OUTPATIENT)
Dept: FAMILY MEDICINE | Facility: OTHER | Age: 28
End: 2023-09-21
Payer: COMMERCIAL

## 2023-09-21 VITALS
BODY MASS INDEX: 20.46 KG/M2 | WEIGHT: 135 LBS | HEIGHT: 68 IN | DIASTOLIC BLOOD PRESSURE: 64 MMHG | TEMPERATURE: 98 F | HEART RATE: 80 BPM | RESPIRATION RATE: 24 BRPM | SYSTOLIC BLOOD PRESSURE: 100 MMHG | OXYGEN SATURATION: 99 %

## 2023-09-21 DIAGNOSIS — Z30.017 INSERTION OF IMPLANTABLE SUBDERMAL CONTRACEPTIVE: ICD-10-CM

## 2023-09-21 DIAGNOSIS — Z00.00 ROUTINE GENERAL MEDICAL EXAMINATION AT A HEALTH CARE FACILITY: Primary | ICD-10-CM

## 2023-09-21 DIAGNOSIS — K59.1 FUNCTIONAL DIARRHEA: ICD-10-CM

## 2023-09-21 DIAGNOSIS — Z30.017 NEXPLANON INSERTION: ICD-10-CM

## 2023-09-21 DIAGNOSIS — Z12.4 CERVICAL CANCER SCREENING: ICD-10-CM

## 2023-09-21 LAB
CHOLEST SERPL-MCNC: 126 MG/DL
HCG UR QL: NEGATIVE
HDLC SERPL-MCNC: 63 MG/DL
LDLC SERPL CALC-MCNC: 52 MG/DL
NONHDLC SERPL-MCNC: 63 MG/DL
TRIGL SERPL-MCNC: 56 MG/DL

## 2023-09-21 PROCEDURE — G0145 SCR C/V CYTO,THINLAYER,RESCR: HCPCS | Performed by: FAMILY MEDICINE

## 2023-09-21 PROCEDURE — 80061 LIPID PANEL: CPT | Performed by: FAMILY MEDICINE

## 2023-09-21 PROCEDURE — 99395 PREV VISIT EST AGE 18-39: CPT | Mod: 25 | Performed by: FAMILY MEDICINE

## 2023-09-21 PROCEDURE — 11981 INSERTION DRUG DLVR IMPLANT: CPT | Performed by: FAMILY MEDICINE

## 2023-09-21 PROCEDURE — 99213 OFFICE O/P EST LOW 20 MIN: CPT | Mod: 25 | Performed by: FAMILY MEDICINE

## 2023-09-21 PROCEDURE — 90686 IIV4 VACC NO PRSV 0.5 ML IM: CPT | Performed by: FAMILY MEDICINE

## 2023-09-21 PROCEDURE — 81025 URINE PREGNANCY TEST: CPT | Performed by: FAMILY MEDICINE

## 2023-09-21 PROCEDURE — 87624 HPV HI-RISK TYP POOLED RSLT: CPT | Performed by: FAMILY MEDICINE

## 2023-09-21 PROCEDURE — 90471 IMMUNIZATION ADMIN: CPT | Performed by: FAMILY MEDICINE

## 2023-09-21 PROCEDURE — 36415 COLL VENOUS BLD VENIPUNCTURE: CPT | Performed by: FAMILY MEDICINE

## 2023-09-21 ASSESSMENT — ENCOUNTER SYMPTOMS
EYE PAIN: 0
JOINT SWELLING: 0
BREAST MASS: 0
CHILLS: 0
NERVOUS/ANXIOUS: 0
MYALGIAS: 0
FEVER: 0
SORE THROAT: 0
HEMATOCHEZIA: 0
ABDOMINAL PAIN: 0
CONSTIPATION: 0
SHORTNESS OF BREATH: 0
PARESTHESIAS: 0
COUGH: 0
DIARRHEA: 1
DYSURIA: 0
ARTHRALGIAS: 0
HEMATURIA: 0
DIZZINESS: 0
HEADACHES: 0
NAUSEA: 0
HEARTBURN: 0
PALPITATIONS: 0
FREQUENCY: 0
WEAKNESS: 0

## 2023-09-21 ASSESSMENT — PAIN SCALES - GENERAL: PAINLEVEL: NO PAIN (0)

## 2023-09-21 NOTE — PROGRESS NOTES
SUBJECTIVE:   CC: Joelle is an 28 year old who presents for preventive health visit.       2023     7:22 AM   Additional Questions   Roomed by duncan sparrow       Healthy Habits:     Getting at least 3 servings of Calcium per day:  Yes    Bi-annual eye exam:  NO    Dental care twice a year:  Yes    Sleep apnea or symptoms of sleep apnea:  None    Diet:  Regular (no restrictions)    Frequency of exercise:  4-5 days/week    Duration of exercise:  45-60 minutes    Taking medications regularly:  Not Applicable    Medication side effects:  None    Additional concerns today:  Yes      Today's PHQ-2 Score:       2023     7:19 AM   PHQ-2 (  [employer])   Q1: Little interest or pleasure in doing things 0   Q2: Feeling down, depressed or hopeless 0   PHQ-2 Score 0   Q1: Little interest or pleasure in doing things Not at all   Q2: Feeling down, depressed or hopeless Not at all   PHQ-2 Score 0         Has been having some diarrhea on and off for months. Had significant mental health issues and divorce since she was last in, but much improved now and starting in on a new relationship.      Social History     Tobacco Use    Smoking status: Never    Smokeless tobacco: Never   Substance Use Topics    Alcohol use: Yes     Comment: 1 a drink a day             2023     7:18 AM   Alcohol Use   Prescreen: >3 drinks/day or >7 drinks/week? No     Reviewed orders with patient.  Reviewed health maintenance and updated orders accordingly - Yes  Lab work is in process    Breast Cancer Screenin/13/2022     8:18 AM   Breast CA Risk Assessment (FHS-7)   Do you have a family history of breast, colon, or ovarian cancer? No / Unknown         Patient under 40 years of age: Routine Mammogram Screening not recommended.   Pertinent mammograms are reviewed under the imaging tab.    History of abnormal Pap smear: NO - age 30-65 PAP every 5 years with negative HPV co-testing recommended      2020    11:34 AM   PAP / HPV   PAP  "(Historical) NIL      Reviewed and updated as needed this visit by clinical staff   Tobacco  Allergies  Meds  Problems  Med Hx  Surg Hx  Fam Hx          Reviewed and updated as needed this visit by Provider   Tobacco  Allergies  Meds  Problems  Med Hx  Surg Hx  Fam Hx             Review of Systems   Constitutional:  Negative for chills and fever.   HENT:  Negative for congestion, ear pain, hearing loss and sore throat.    Eyes:  Negative for pain and visual disturbance.   Respiratory:  Negative for cough and shortness of breath.    Cardiovascular:  Negative for chest pain, palpitations and peripheral edema.   Gastrointestinal:  Positive for diarrhea. Negative for abdominal pain, constipation, heartburn, hematochezia and nausea.   Breasts:  Negative for tenderness, breast mass and discharge.   Genitourinary:  Negative for dysuria, frequency, genital sores, hematuria, pelvic pain, urgency, vaginal bleeding and vaginal discharge.   Musculoskeletal:  Negative for arthralgias, joint swelling and myalgias.   Skin:  Negative for rash.   Neurological:  Negative for dizziness, weakness, headaches and paresthesias.   Psychiatric/Behavioral:  Negative for mood changes. The patient is not nervous/anxious.           OBJECTIVE:   /64 (BP Location: Right arm, Patient Position: Sitting, Cuff Size: Adult Regular)   Pulse 80   Temp 98  F (36.7  C) (Temporal)   Resp 24   Ht 1.735 m (5' 8.31\")   Wt 61.2 kg (135 lb)   LMP 09/04/2023   SpO2 99%   BMI 20.34 kg/m    Physical Exam  GENERAL: healthy, alert and no distress  EYES: Eyes grossly normal to inspection, PERRL and conjunctivae and sclerae normal  HENT: ear canals and TM's normal, nose and mouth without ulcers or lesions  NECK: no adenopathy, no asymmetry, masses, or scars and thyroid normal to palpation  RESP: lungs clear to auscultation - no rales, rhonchi or wheezes  BREAST: normal without masses, tenderness or nipple discharge and no palpable axillary " masses or adenopathy  CV: regular rate and rhythm, normal S1 S2, no S3 or S4, no murmur, click or rub, no peripheral edema and peripheral pulses strong  ABDOMEN: soft, nontender, no hepatosplenomegaly, no masses and bowel sounds normal   (female): normal female external genitalia, normal urethral meatus, vaginal mucosa pink, moist, well rugated, and normal cervix/adnexa/uterus without masses or discharge  MS: no gross musculoskeletal defects noted, no edema  SKIN: no suspicious lesions or rashes  NEURO: Normal strength and tone, mentation intact and speech normal  PSYCH: mentation appears normal, affect normal/bright        ASSESSMENT/PLAN:       ICD-10-CM    1. Routine general medical examination at a health care facility  Z00.00 Lipid panel reflex to direct LDL Fasting     Lipid panel reflex to direct LDL Fasting      2. Functional diarrhea  K59.1 Adult GI  Referral - Consult Only      3. Insertion of implantable subdermal contraceptive  Z30.017       4. Cervical cancer screening  Z12.4 Pap Screen with HPV - recommended age 30 - 65 years      5. Nexplanon insertion  Z30.017 HCG qualitative urine     etonogestrel (NEXPLANON) subdermal implant 68 mg     INSERTION NON-BIODEGRADABLE DRUG DELIVERY IMPLANT     HCG qualitative urine        Patient has been dealing with mental health issues which are much improved after she has been working with a therapist.  She is now in a new relationship and looking for birth control planning's we did talk about her options and she chose the Nexplanon which will be inserted today.  She has had intermittent diarrhea which we did talk about could be any number of reasons but oftentimes is related to the food she is eating the stress that she is experiencing is IBS is the description that she is giving today.  We did give her a FODMAP diet and talk about the possible things that may trigger her and she has asked about referral to the GI which we did agree to but asked her to  try and do dietary adjustments first to see if we can make this better.  Ultimately if she is having a lot of IBS symptoms medications for her anxiety may be helpful even though she is feeling much better    Patient has been advised of split billing requirements and indicates understanding: Yes      COUNSELING:  Reviewed preventive health counseling, as reflected in patient instructions       Regular exercise       Healthy diet/nutrition        She reports that she has never smoked. She has never used smokeless tobacco.          Nicole Szymanski MD, MD  [facility]

## 2023-09-21 NOTE — PROGRESS NOTES
"Nexplanon Insertion:    Is a pregnancy test required: Yes.  Was it positive or negative?  Negative  Was a consent obtained?  Yes    Subjective: Joelle Enriquez is a 28 year old No obstetric history on file. presents for Nexplanon.    Patient has been given the opportunity to ask questions about all forms of birth control, including all options appropriate for Joelle Enriquez. Discussed that no method of birth control, except abstinence is 100% effective against pregnancy or sexually transmitted infection.     Joelle Enriquez understands she may have the Nexplanon removed at any time and it should be removed by a health care provider.    The entire insertion procedure was reviewed with the patient, including care after placement.    Patient's last menstrual period was 09/04/2023. Last sexual activity: 3 months . No allergy to betadine or shellfish. Patient declines STD screening  No results found for: HCG      /64 (BP Location: Right arm, Patient Position: Sitting, Cuff Size: Adult Regular)   Pulse 80   Temp 98  F (36.7  C) (Temporal)   Resp 24   Ht 1.735 m (5' 8.31\")   Wt 61.2 kg (135 lb)   LMP 09/04/2023   SpO2 99%   BMI 20.34 kg/m      PROCEDURE NOTE: -- Nexplanon Insertion    Reason for Insertion: contraception    Patient was placed supine with left arm exposed.  Rohit was made 8-10 cm above medial epicondyle and a guiding rohit 4 cm above the first.  Arm was prepped with Betadine. Insertion point was anesthetized with 2 mL 1% lidocaine. After stretching the skin with thumb and index finger around the insertion site, skin punctured with the tip of the needle inserted at 30 degrees and then lowered to horizontal position. The needle was then advanced to its full length. Applicator was then stabilized and slider was unlocked. Slider was pulled back until it stopped and then removed.    Correct placement of the implant was confirmed by palpation in the patient's arm and visualizing the purple top " of the obturator.   Bandage and pressure dressing applied to insertion site.    EBL: minimal    Complications: none    ASSESSMENT:     ICD-10-CM    1. Routine general medical examination at a health care facility  Z00.00       2. Cervical cancer screening  Z12.4 Pap Screen with HPV - recommended age 30 - 65 years      3. Nexplanon insertion  Z30.017 HCG qualitative urine      4. Insertion of implantable subdermal contraceptive  Z30.017              PLAN:    Given 's handouts, including when to have Nexplanon removed, list of danger s/sx, side effects and follow up recommended. Encouraged condom use for prevention of STD. Back up contraception advised for 7 days. Advised to call for any fever, for prolonged or severe pain or bleeding, abnormal vaginal dischage. She was advised to use pain medications (ibuprofen) as needed for mild to moderate pain.     Nicole Szymanski MD, MD

## 2023-09-22 NOTE — TELEPHONE ENCOUNTER
REFERRAL INFORMATION:  Referring Provider:  Nicole Szymanski MD   Referring Clinic:  [facility]  Reason for Visit/Diagnosis: Functional diarrhea [K59.1]     FUTURE VISIT INFORMATION:  Appointment Date: 9/26/23  Appointment Time: 3:00 PM     NOTES STATUS DETAILS   OFFICE NOTE from Referring Provider Internal 9/21/23 - Nicole Szymanski MD - [facility] - Functional Diarrhea   MEDICATION LIST Internal

## 2023-09-25 LAB
BKR LAB AP GYN ADEQUACY: NORMAL
BKR LAB AP GYN INTERPRETATION: NORMAL
BKR LAB AP HPV REFLEX: NORMAL
BKR LAB AP PREVIOUS ABNORMAL: NORMAL
PATH REPORT.COMMENTS IMP SPEC: NORMAL
PATH REPORT.COMMENTS IMP SPEC: NORMAL
PATH REPORT.RELEVANT HX SPEC: NORMAL

## 2023-09-26 ENCOUNTER — PRE VISIT (OUTPATIENT)
Dept: GASTROENTEROLOGY | Facility: CLINIC | Age: 28
End: 2023-09-26

## 2023-09-26 ENCOUNTER — VIRTUAL VISIT (OUTPATIENT)
Dept: GASTROENTEROLOGY | Facility: CLINIC | Age: 28
End: 2023-09-26
Attending: FAMILY MEDICINE
Payer: COMMERCIAL

## 2023-09-26 DIAGNOSIS — K59.1 FUNCTIONAL DIARRHEA: ICD-10-CM

## 2023-09-26 DIAGNOSIS — R19.5 LOOSE STOOLS: Primary | ICD-10-CM

## 2023-09-26 PROCEDURE — 99204 OFFICE O/P NEW MOD 45 MIN: CPT | Mod: VID | Performed by: PHYSICIAN ASSISTANT

## 2023-09-26 ASSESSMENT — PAIN SCALES - GENERAL: PAINLEVEL: NO PAIN (0)

## 2023-09-26 NOTE — LETTER
"    9/26/2023         RE: Joelle Enriquez  [address]  [facility] [address]        Dear Colleague,    Thank you for referring your patient, Joelle Enriquez, to the [facility] [place]. Please see a copy of my visit note below.      GI CLINIC VISIT    CC/REFERRING MD:  Nicole Szymanski  REASON FOR CONSULTATION: functional diarrhea     HPI  Joelle Enriquez is a 28 year old year old female with PMHx of ADHD presenting to establish care with the [facility] GI group for diarrhea.     Shares she has had diarrhea ongoing for years, spanning into her 20s. Seen previously by an unknown provider and diagnosed with IBS-D.   She reached out for an appt today today due to a flare in diarrhea which lead to an episode of fecal incontinence. She wanted GI input on her ongoing diarrhea episodes.     Shares she is on a very strict diet that was given to her years ago  - sounds like some variation of low fodmap diet with very limited retrial. It has a list of \"safe foods,\" which she tends to eat regularly.   She self-initiated this diet.     Diet recall   B - Yogurt and fruit  L - Salad w/ chicken  D - Rice, veggie, chicken   If she feels adventurous, she will have a turkey sandwich with bread, tomatoes and lettuce and seems to tolerate that OK.   Eats a [place] rrasberry and blueberry yogurt daily and seems to tolerate it.   Eating gluten.     The other day she and her friends went out to eat Mexican. She had a blake burrito and could not make it the short trip back home before having intense fecal urgency and then an episode of fecal incontinence.   She has cut out chinese food (her favorite) as it tends to also trigger episodes of looser stools.   In summer, she ate a nectarine and felt her abd \"blow up\" in size with resultant early satiety (did not feel hungry for a long time afterwards).   Pizza is a trigger for her.   She also identifies stress as a triggers for changes in stooling " "pattern.     If she follows her diet strictly, she will typically have 1 BM described as once daily shortly after having a cup of decaf coffee in AM. Starts off hard, gets mushy then becomes liquid. This is all in the same BM. No mucus , blood or black. She is on toilet for a few min passing BM with no or very mild straining. Endorses good evacuation. Denies abd pain.     If she strays from her normal diet, she will immediately have fecal urgency, abdominal pain and then very loose stools (\"liquid with blobs\") w/o blood or black. Coloration also tends to change as well, turning neon, brighter yellow. Days of flare are described as occurring once weekly, especially if she does something new or eats a new food.On a bad day, she can be on the toilet q1h passing a BM. It used to be daily until she started her strict diet.   She reports come pain with defecation that then relieve with having a BM to rectum or bottom but not necessarily to abdomen.     On vacations, finds she gets constipation described as harder drier stooling . Tells me the stress is gone but not able to poop. Not on any meds for bowels during this time. Deals with it when she gets home.     Stooling pattern is associated with bloat that tends to worsen with menstrual cycle.   NO F/C/N/V, heartburn, regurgation     She denies known FHX of IBD, CRC, polyps or polyposis syndrome.   She does use NSAIDs about 3 days of the month during her period - a midol and ibuprofen PM. She recently got nexplanon so she's hopeful this will cut down on the need for these meds.     Stressors - admits she went through a lot emotional stressors and trauma in her 20s.     Her goal is to be able to go out to eat and friends and not have to be hyperviligant about access to a toilet and having an episode of fecal incontinence.     Weight stable   Appetite good     ROS  Denies fevers, chills, weight loss, nausea, emesis, dysphagia, odynophagia, regular heartburn, regurgitation, " abdominal pain, black tarrying stools, bloody stools, rectal itching.     Family Hx    Paternal aunt with celiac disease - hives on her body   No other known family history or GI related malignancy (esophageal, gastric, pancreatic, liver or colon) or family history of IBD/celiac disease.     Social Hx     Occ use of NSAIDs or Tylenol.   Monthly Midol and ipburpron PM at night time x 3 days   No use of OTC herbal supplements/weight loss products.      Occ 1-2 beer once weekly ETOH  No tobacco products.   No No recreational drug use.     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    PREVIOUS SURGERIES:  No past surgical history on file.    ALLERGIES:   No Known Allergies    PERTINENT MEDICATIONS:    Current Outpatient Medications:     etonogestrel (NEXPLANON) 68 MG IMPL, 1 each (68 mg) by Subdermal route once, Disp: , Rfl:     SOCIAL HISTORY:  Social History     Socioeconomic History    Marital status:      Spouse name: Not on file    Number of children: Not on file    Years of education: Not on file    Highest education level: Not on file   Occupational History    Not on file   Tobacco Use    Smoking status: Never    Smokeless tobacco: Never   Vaping Use    Vaping Use: Never used   Substance and Sexual Activity    Alcohol use: Yes     Comment: 1 a drink a day    Drug use: Never    Sexual activity: Yes     Partners: Male   Other Topics Concern    Not on file   Social History Narrative    Not on file     Social Determinants of Health     Financial Resource Strain: Low  Risk  (9/21/2023)    Financial Resource Strain     Within the past 12 months, have you or your family members you live with been unable to get utilities (heat, electricity) when it was really needed?: No   Food Insecurity: Low Risk  (9/21/2023)    Food Insecurity     Within the past 12 months, did you worry that your food would run out before you got money to buy more?: No     Within the past 12 months, did the food you bought just not last and you didn t have money to get more?: No   Transportation Needs: Low Risk  (9/21/2023)    Transportation Needs     Within the past 12 months, has lack of transportation kept you from medical appointments, getting your medicines, non-medical meetings or appointments, work, or from getting things that you need?: No   Physical Activity: Not on file   Stress: Not on file   Social Connections: Not on file   Interpersonal Safety: Low Risk  (9/21/2023)    Interpersonal Safety     Do you feel physically and emotionally safe where you currently live?: Yes     Within the past 12 months, have you been hit, slapped, kicked or otherwise physically hurt by someone?: No     Within the past 12 months, have you been humiliated or emotionally abused in other ways by your partner or ex-partner?: No   Housing Stability: Low Risk  (9/21/2023)    Housing Stability     Do you have housing? : Yes     Are you worried about losing your housing?: No     FAMILY HISTORY:  No family history on file.    Past/family/social history reviewed and no changes    PHYSICAL EXAMINATION:  Vitals reviewed: LMP 09/04/2023   Wt:   Wt Readings from Last 2 Encounters:   09/21/23 61.2 kg (135 lb)   04/13/22 59.2 kg (130 lb 8 oz)      Video physical exam  General: Patient appears well in no acute distress.   Skin: No visualized rash or lesions on visualized skin  Eyes: EOMI, no erythema, sclera icterus or discharge noted  Resp: Appears to be breathing comfortably without accessory muscle usage, speaking in full sentences, no  cough  MSK: Appears to have normal range of motion based on visualized movements  Neurologic: No apparent tremors, facial movements symmetric  Psych: affect normal, alert and oriented    PERTINENT STUDIES:    Office Visit on 09/21/2023   Component Date Value Ref Range Status    Interpretation 09/21/2023 Negative for Intraepithelial Lesion or Malignancy (NILM)    Final    Comment 09/21/2023    Final                    Value:This result contains rich text formatting which cannot be displayed here.    Specimen Adequacy 09/21/2023 Satisfactory for evaluation, endocervical/transformation zone component present   Final    Clinical Information 09/21/2023    Final                    Value:This result contains rich text formatting which cannot be displayed here.    Reflex Testing 09/21/2023 Yes regardless of result   Final    Previous Abnormal? 09/21/2023    Final                    Value:This result contains rich text formatting which cannot be displayed here.    Performing Labs 09/21/2023    Final                    Value:This result contains rich text formatting which cannot be displayed here.    hCG Urine Qualitative 09/21/2023 Negative  Negative Final    Cholesterol 09/21/2023 126  <200 mg/dL Final    Triglycerides 09/21/2023 56  <150 mg/dL Final    Direct Measure HDL 09/21/2023 63  >=50 mg/dL Final    LDL Cholesterol Calculated 09/21/2023 52  <=100 mg/dL Final    Non HDL Cholesterol 09/21/2023 63  <130 mg/dL Final        Lab Results   Component Value Date    CHOL 126 09/21/2023    TRIG 56 09/21/2023    HDL 63 09/21/2023        Liver Function Studies - No results for input(s): PROTTOTAL, ALBUMIN, BILITOTAL, ALKPHOS, AST, ALT, BILIDIRECT in the last [id number] hours.     PREVIOUS ENDOSCOPY    No results found for this or any previous visit.    ASSESSMENT/PLAN:  Joelle Enriquez is a 28 year old year old female with PMHx significant for ADHS and remote dx of IBS who presents to [facility] care with the  [facility]  for diarrhea. She reports chronic episodes of diarrhea with flares associated with increased stooling and changes in stool consistency. Triggers include coffee, chinese/mexican/pizza, and notably stress. For many years she has self-initiated a very restrictive diet which seems to have helped her lower GI symptoms. She has not had any work up for these symptoms.   FH significant for celiac disease in P Aunt   She presents today for further guidance in care     #Chronic diarrhea with once weekly flares of increased stooling and changes in consistency    Presenting symptoms are most consistent with IBS-D however she denies predominate abdominal pain component. DDX also includes functional diarrhea, IBD (particularly to rectum / lower colon given her report of pain)/inflammatory conditions, malabsorption (celiac, lactose, fructose, SIBO less likely EPI), infection, hyperthyroidism vs other  -check inflammatory markers Esr/CRP and fecal calpro. If highly abnl, we discussed plan to proceed with diagnostic colonoscopy   -check infectious stool panel - enteric, O&P, c.diff  -check cbc, cmp, and tsh to help guide care   -check TTG Ab with total IgA for celiac r/o   -referral to GI RD today for dietary evaluation and discussion of low fodmap diet and retrial   -given restrictive diet, check CBC and vit D. If anemia, will plan to add other nutrient markers (b12, rbc folate, iron, ferritin)  -start fiber per AVS    Future consideration  1. IB Silvia trial. Can consider scheduled Imodium if infectious stool panel negative.   2. SIBO evaluation   3. Start of TCA or other neuromodulator  4. Referral to health PsyD Dr Casarez     Orders Placed This Encounter   Procedures    Calprotectin Feces     Standing Status:   Future     Standing Expiration Date:   9/26/2024    Tissue transglutaminase maggie IgA and IgG     Standing Status:   Future     Standing Expiration Date:   9/26/2024    IgA     Standing Status:   Future     Standing  Expiration Date:   2024    TSH with free T4 reflex     Standing Status:   Future     Standing Expiration Date:   2024    CBC with platelets     Standing Status:   Future     Standing Expiration Date:   2024    Comprehensive metabolic panel     Standing Status:   Future     Standing Expiration Date:   2024    Vitamin D Deficiency     Standing Status:   Future     Standing Expiration Date:   2024    Enteric Bacteria and Virus Panel by KHADAR Stool     Standing Status:   Future     Standing Expiration Date:   2024     Order Specific Question:   Has patient been hospitalized for greater than 3 days?     Answer:   No     Colorectal cancer screenin, unless otherwise clinically indicated     RTC 6- 8 weeks, or PRN     Thank you for this consultation.  It was a pleasure to participate in the care of this patient; please contact me with any further questions.     Brigette Erazo PA-C    Follow up: As planned above. Today, I personally spent 33 minutes in direct face to face time with the patient, of which greater than 50% of the time was spent in patient education and counseling as described above. Approximately 20  minutes were spent on indirect care associated with the patient's consultation including but not limited to review of: patient medical records to date, clinic visits, hospital records, lab results, imaging studies, procedural documentation, and coordinating care with other providers. The findings from this review are summarized in the above note. All of the above accounted for a cumulative time of 53 minutes and was performed on the date of service.       Virtual Visit Details    Type of service:  Video Visit     Originating Location (pt. Location): Home in [place]     Distant Location (provider location):  On-site  Platform used for Video Visit: Margarita     3:04 - 3:38p  Time total 33 min     Patient consented to allowing dietary intern shadow our visit - Louisa Peña                Again, thank you for allowing me to participate in the care of your patient.      Sincerely,    Brigette Erazo PA-C

## 2023-09-26 NOTE — NURSING NOTE
Is the patient currently in the state of [place]? YES    Visit mode:VIDEO    If the visit is dropped, the patient can be reconnected by: VIDEO VISIT: Send to e-mail at: [email or web address]    Will anyone else be joining the visit? NO  (If patient encounters technical issues they should call [phone]    How would you like to obtain your AVS? MyChart    Are changes needed to the allergy or medication list? Pt stated no changes to allergies and Pt stated no med changes    Reason for visit: Consult    Meghan NINO

## 2023-09-26 NOTE — PROGRESS NOTES
Virtual Visit Details    Type of service:  Video Visit     Originating Location (pt. Location): Home in [place]     Distant Location (provider location):  On-site  Platform used for Video Visit: Margarita     3:04 - 3:38p  Time total 33 min     Patient consented to allowing dietary intern shadow our visit - Louisa Peña

## 2023-09-26 NOTE — PATIENT INSTRUCTIONS
It was a pleasure taking care of you today.  I've included a brief summary of our discussion and care plan from today's visit below.  Please review this information with your primary care provider.  _______________________________________________________________________    My recommendations are summarized as follows:    Labs and stool studies ordered for further evaluation. Please see below number to call for an appt.   Ok to add on daily fiber powder for now. Try citrucel 1 tsp daily for 2 weeks, increase gradually by a tsp weekly. A good goal is 1 tablespoon a day.   Let's get you in to see dietary to get her input on dietary intake and overall management of symptoms. Her name is Alecia Degroot     Please call RN Care Coordinator Margareth at [phone] with any questions or concerns.    Return to GI Clinic in 6-8 weeks to review your progress.    _______________________________________________________________________    How do I schedule labs, imaging studies, or procedures that were ordered in clinic today?      Labs: To schedule lab appointment at the [facility], use my chart or call [phone]. If you have a [place] lab closer to home where you are regularly seen you can give them a call.      Procedures: If a colonoscopy, upper endoscopy, breath test, esophageal manometry, or pH impedence was ordered today, our endoscopy team will call you to schedule this. If you have not heard from our endoscopy team within a week, please call [phone] option 2 to schedule.      Imaging Studies: If you were scheduled for a CT scan, X-ray, MRI, ultrasound, HIDA scan or other imaging study, please call [phone] to have this scheduled.      Referral: If a referral to another specialty was ordered, expect a phone call or follow instructions above. If you have not heard from anyone regarding your referral in a week, please call our clinic to check the status.      Who do I call with any questions after  my visit?  Please be in touch if there are any further questions that arise following today's visit.  There are multiple ways to contact your gastroenterology care team.       During business hours, you may reach a Gastroenterology nurse at [phone]     To schedule or reschedule an appointment, please call [phone].      You can always send a secure message through [employer].  [employer] messages are answered by your nurse or doctor typically within 24 hours.  Please allow extra time on weekends and holidays.       For urgent/emergent questions after business hours, you may reach the on-call GI Fellow by contacting the [facility]  at [phone].     How will I get the results of any tests ordered?    You will receive all of your results.  If you have signed up for [employer], any tests ordered at your visit will be available to you after your physician reviews them.  Typically this takes 1-2 weeks.  If there are urgent results that require a change in your care plan, your physician or nurse will call you to discuss the next steps.       What is [employer]?  [employer] is a secure way for you to access all of your healthcare records from the [facility].  It is a web based computer program, so you can sign on to it from any location.  It also allows you to send secure messages to your care team.  I recommend signing up for [employer] access if you have not already done so and are comfortable with using a computer.       How to I schedule a follow-up visit?  If you did not schedule a follow-up visit today, please call [phone] to schedule a follow-up office visit.      Sincerely,    Brigette Erazo PA-C  Gastroenterology

## 2023-09-26 NOTE — PROGRESS NOTES
"  GI CLINIC VISIT    CC/REFERRING MD:  Nicole Szymanski  REASON FOR CONSULTATION: functional diarrhea     HPI  Joelle Enriquez is a 28 year old year old female with PMHx of ADHD presenting to establish care with the [facility] group for diarrhea.     Sharad she has had diarrhea ongoing for years, spanning into her 20s. Seen previously by an unknown provider and diagnosed with IBS-D.   She reached out for an appt today today due to a flare in diarrhea which lead to an episode of fecal incontinence. She wanted GI input on her ongoing diarrhea episodes.     Sharad she is on a very strict diet that was given to her years ago  - sounds like some variation of low fodmap diet with very limited retrial. It has a list of \"safe foods,\" which she tends to eat regularly.   She self-initiated this diet.     Diet recall   B - Yogurt and fruit  L - Salad w/ chicken  D - Rice, veggie, chicken   If she feels adventurous, she will have a turkey sandwich with bread, tomatoes and lettuce and seems to tolerate that OK.   Eats a [place] rrasberry and blueberry yogurt daily and seems to tolerate it.   Eating gluten.     The other day she and her friends went out to eat Mexican. She had a lbake burrito and could not make it the short trip back home before having intense fecal urgency and then an episode of fecal incontinence.   She has cut out chinese food (her favorite) as it tends to also trigger episodes of looser stools.   In summer, she ate a nectarine and felt her abd \"blow up\" in size with resultant early satiety (did not feel hungry for a long time afterwards).   Pizza is a trigger for her.   She also identifies stress as a triggers for changes in stooling pattern.     If she follows her diet strictly, she will typically have 1 BM described as once daily shortly after having a cup of decaf coffee in AM. Starts off hard, gets mushy then becomes liquid. This is all in the same BM. No mucus , blood or black. She is on toilet for a few min " "passing BM with no or very mild straining. Endorses good evacuation. Denies abd pain.     If she strays from her normal diet, she will immediately have fecal urgency, abdominal pain and then very loose stools (\"liquid with blobs\") w/o blood or black. Coloration also tends to change as well, turning neon, brighter yellow. Days of flare are described as occurring once weekly, especially if she does something new or eats a new food.On a bad day, she can be on the toilet q1h passing a BM. It used to be daily until she started her strict diet.   She reports come pain with defecation that then relieve with having a BM to rectum or bottom but not necessarily to abdomen.     On vacations, finds she gets constipation described as harder drier stooling . Tells me the stress is gone but not able to poop. Not on any meds for bowels during this time. Deals with it when she gets home.     Stooling pattern is associated with bloat that tends to worsen with menstrual cycle.   NO F/C/N/V, heartburn, regurgation     She denies known FHX of IBD, CRC, polyps or polyposis syndrome.   She does use NSAIDs about 3 days of the month during her period - a midol and ibuprofen PM. She recently got nexplanon so she's hopeful this will cut down on the need for these meds.     Stressors - admits she went through a lot emotional stressors and trauma in her 20s.     Her goal is to be able to go out to eat and friends and not have to be hyperviligant about access to a toilet and having an episode of fecal incontinence.     Weight stable   Appetite good     ROS  Denies fevers, chills, weight loss, nausea, emesis, dysphagia, odynophagia, regular heartburn, regurgitation, abdominal pain, black tarrying stools, bloody stools, rectal itching.     Family Hx    Paternal aunt with celiac disease - hives on her body   No other known family history or GI related malignancy (esophageal, gastric, pancreatic, liver or colon) or family history of IBD/celiac " disease.     Social Hx     Occ use of NSAIDs or Tylenol.   Monthly Midol and ipburpron PM at night time x 3 days   No use of OTC herbal supplements/weight loss products.      Occ 1-2 beer once weekly ETOH  No tobacco products.   No No recreational drug use.     Works an office job     PROBLEM LIST  Patient Active Problem List    Diagnosis Date Noted     Attention deficit hyperactivity disorder (ADHD) 01/29/2020     Priority: Medium     INATTENTIVE       Dermatographia 01/29/2020     Priority: Medium     EXERCISED INDUCED, cleared up with Zyrtec, not a problem now, saw Dr Finney       Anxiety disorder 01/10/2013     Priority: Medium     Dysmenorrhea 10/08/2010     Priority: Medium       PERTINENT PAST MEDICAL HISTORY:  No past medical history on file.    PREVIOUS SURGERIES:  No past surgical history on file.    ALLERGIES:   No Known Allergies    PERTINENT MEDICATIONS:    Current Outpatient Medications:      etonogestrel (NEXPLANON) 68 MG IMPL, 1 each (68 mg) by Subdermal route once, Disp: , Rfl:     SOCIAL HISTORY:  Social History     Socioeconomic History     Marital status:      Spouse name: Not on file     Number of children: Not on file     Years of education: Not on file     Highest education level: Not on file   Occupational History     Not on file   Tobacco Use     Smoking status: Never     Smokeless tobacco: Never   Vaping Use     Vaping Use: Never used   Substance and Sexual Activity     Alcohol use: Yes     Comment: 1 a drink a day     Drug use: Never     Sexual activity: Yes     Partners: Male   Other Topics Concern     Not on file   Social History Narrative     Not on file     Social Determinants of Health     Financial Resource Strain: Low Risk  (9/21/2023)    Financial Resource Strain      Within the past 12 months, have you or your family members you live with been unable to get utilities (heat, electricity) when it was really needed?: No   Food Insecurity: Low Risk  (9/21/2023)    Food  Insecurity      Within the past 12 months, did you worry that your food would run out before you got money to buy more?: No      Within the past 12 months, did the food you bought just not last and you didn t have money to get more?: No   Transportation Needs: Low Risk  (9/21/2023)    Transportation Needs      Within the past 12 months, has lack of transportation kept you from medical appointments, getting your medicines, non-medical meetings or appointments, work, or from getting things that you need?: No   Physical Activity: Not on file   Stress: Not on file   Social Connections: Not on file   Interpersonal Safety: Low Risk  (9/21/2023)    Interpersonal Safety      Do you feel physically and emotionally safe where you currently live?: Yes      Within the past 12 months, have you been hit, slapped, kicked or otherwise physically hurt by someone?: No      Within the past 12 months, have you been humiliated or emotionally abused in other ways by your partner or ex-partner?: No   Housing Stability: Low Risk  (9/21/2023)    Housing Stability      Do you have housing? : Yes      Are you worried about losing your housing?: No     FAMILY HISTORY:  No family history on file.    Past/family/social history reviewed and no changes    PHYSICAL EXAMINATION:  Vitals reviewed: LMP 09/04/2023   Wt:   Wt Readings from Last 2 Encounters:   09/21/23 61.2 kg (135 lb)   04/13/22 59.2 kg (130 lb 8 oz)      Video physical exam  General: Patient appears well in no acute distress.   Skin: No visualized rash or lesions on visualized skin  Eyes: EOMI, no erythema, sclera icterus or discharge noted  Resp: Appears to be breathing comfortably without accessory muscle usage, speaking in full sentences, no cough  MSK: Appears to have normal range of motion based on visualized movements  Neurologic: No apparent tremors, facial movements symmetric  Psych: affect normal, alert and oriented    PERTINENT STUDIES:    Office Visit on 09/21/2023    Component Date Value Ref Range Status     Interpretation 09/21/2023 Negative for Intraepithelial Lesion or Malignancy (NILM)    Final     Comment 09/21/2023    Final                    Value:This result contains rich text formatting which cannot be displayed here.     Specimen Adequacy 09/21/2023 Satisfactory for evaluation, endocervical/transformation zone component present   Final     Clinical Information 09/21/2023    Final                    Value:This result contains rich text formatting which cannot be displayed here.     Reflex Testing 09/21/2023 Yes regardless of result   Final     Previous Abnormal? 09/21/2023    Final                    Value:This result contains rich text formatting which cannot be displayed here.     Performing Labs 09/21/2023    Final                    Value:This result contains rich text formatting which cannot be displayed here.     hCG Urine Qualitative 09/21/2023 Negative  Negative Final     Cholesterol 09/21/2023 126  <200 mg/dL Final     Triglycerides 09/21/2023 56  <150 mg/dL Final     Direct Measure HDL 09/21/2023 63  >=50 mg/dL Final     LDL Cholesterol Calculated 09/21/2023 52  <=100 mg/dL Final     Non HDL Cholesterol 09/21/2023 63  <130 mg/dL Final        Lab Results   Component Value Date    CHOL 126 09/21/2023    TRIG 56 09/21/2023    HDL 63 09/21/2023        Liver Function Studies - No results for input(s): PROTTOTAL, ALBUMIN, BILITOTAL, ALKPHOS, AST, ALT, BILIDIRECT in the last 62753 hours.     PREVIOUS ENDOSCOPY    No results found for this or any previous visit.    ASSESSMENT/PLAN:  Joelle Enriquez is a 28 year old year old female with PMHx significant for ADHS and remote dx of IBS who presents [facility] for [facility]. She reports chronic episodes of diarrhea with flares associated with increased stooling and changes in stool consistency. Triggers include coffee, chinese/mexican/pizza, and notably stress. For many years she  has self-initiated a very restrictive diet which seems to have helped her lower GI symptoms. She has not had any work up for these symptoms.   FH significant for celiac disease in P Aunt   She presents today for further guidance in care     #Chronic diarrhea with once weekly flares of increased stooling and changes in consistency    Presenting symptoms are most consistent with IBS-D however she denies predominate abdominal pain component. DDX also includes functional diarrhea, IBD (particularly to rectum / lower colon given her report of pain)/inflammatory conditions, malabsorption (celiac, lactose, fructose, SIBO less likely EPI), infection, hyperthyroidism vs other  -check inflammatory markers Esr/CRP and fecal calpro. If highly abnl, we discussed plan to proceed with diagnostic colonoscopy   -check infectious stool panel - enteric, O&P, c.diff  -check cbc, cmp, and tsh to help guide care   -check TTG Ab with total IgA for celiac r/o   -referral to GI RD today for dietary evaluation and discussion of low fodmap diet and retrial   -given restrictive diet, check CBC and vit D. If anemia, will plan to add other nutrient markers (b12, rbc folate, iron, ferritin)  -start fiber per AVS    Future consideration  1. IB Silvia trial. Can consider scheduled Imodium if infectious stool panel negative.   2. SIBO evaluation   3. Start of TCA or other neuromodulator  4. Referral to health PsyD Dr Casarez     Orders Placed This Encounter   Procedures     Calprotectin Feces     Standing Status:   Future     Standing Expiration Date:   9/26/2024     Tissue transglutaminase maggie IgA and IgG     Standing Status:   Future     Standing Expiration Date:   9/26/2024     IgA     Standing Status:   Future     Standing Expiration Date:   9/26/2024     TSH with free T4 reflex     Standing Status:   Future     Standing Expiration Date:   9/26/2024     CBC with platelets     Standing Status:   Future     Standing Expiration Date:   9/26/2024      Comprehensive metabolic panel     Standing Status:   Future     Standing Expiration Date:   2024     Vitamin D Deficiency     Standing Status:   Future     Standing Expiration Date:   2024     Enteric Bacteria and Virus Panel by KHADAR Stool     Standing Status:   Future     Standing Expiration Date:   2024     Order Specific Question:   Has patient been hospitalized for greater than 3 days?     Answer:   No     Colorectal cancer screenin, unless otherwise clinically indicated     RTC 6- 8 weeks, or PRN     Thank you for this consultation.  It was a pleasure to participate in the care of this patient; please contact me with any further questions.     Brigette Erazo PA-C    Follow up: As planned above. Today, I personally spent 33 minutes in direct face to face time with the patient, of which greater than 50% of the time was spent in patient education and counseling as described above. Approximately 20  minutes were spent on indirect care associated with the patient's consultation including but not limited to review of: patient medical records to date, clinic visits, hospital records, lab results, imaging studies, procedural documentation, and coordinating care with other providers. The findings from this review are summarized in the above note. All of the above accounted for a cumulative time of 53 minutes and was performed on the date of service.

## 2023-09-27 LAB
HUMAN PAPILLOMA VIRUS 16 DNA: NEGATIVE
HUMAN PAPILLOMA VIRUS 18 DNA: NEGATIVE
HUMAN PAPILLOMA VIRUS FINAL DIAGNOSIS: NORMAL
HUMAN PAPILLOMA VIRUS OTHER HR: NEGATIVE

## 2023-10-04 ENCOUNTER — TELEPHONE (OUTPATIENT)
Dept: GASTROENTEROLOGY | Facility: CLINIC | Age: 28
End: 2023-10-04
Payer: COMMERCIAL

## 2023-10-04 NOTE — TELEPHONE ENCOUNTER
LVM and sent mychart x1    Schedule:  6-8 wk follow-up with Brigette Erazo (around Nov 2023). RTN GI, in person or virtual.

## 2023-11-08 ENCOUNTER — VIRTUAL VISIT (OUTPATIENT)
Dept: GASTROENTEROLOGY | Facility: CLINIC | Age: 28
End: 2023-11-08
Attending: PHYSICIAN ASSISTANT
Payer: COMMERCIAL

## 2023-11-08 DIAGNOSIS — K59.1 FUNCTIONAL DIARRHEA: ICD-10-CM

## 2023-11-08 DIAGNOSIS — R19.5 LOOSE STOOLS: ICD-10-CM

## 2023-11-08 PROCEDURE — 99207 PR NO CHARGE LOS: CPT | Mod: 95 | Performed by: DIETITIAN, REGISTERED

## 2023-11-08 PROCEDURE — 97802 MEDICAL NUTRITION INDIV IN: CPT | Mod: 95 | Performed by: DIETITIAN, REGISTERED

## 2023-11-08 NOTE — PROGRESS NOTES
Virtual Visit Details    Type of service:  Video Visit     Originating Location (pt. Location): Home  Distant Location (provider location):  Off-site  Platform used for Video Visit: [facility] Outpatient Medical Nutrition Therapy      Time Spent:  40 minutes  Session Type:  Initial   Referring Physician:  Brigette Erazo PA-C  Reason for RD Visit:   loose stools    Nutrition Assessment:  Patient is a 28 year old female with history that includes ADHD, anxiety, loose stools, functional diarrhea. She stated that prior to meeting with her GI provider, her PCP had given her a first list for IBS/low fodmap food list. She followed this list and also was very restrictive with her food choices. She ate chicken, rice and broccoli every night for dinner for example. After meeting with GI provider, she started taking citrucel and taking 2 T per day. This has been extremely helpful. Additionally she discontinued eating dairy and switched to a non dairy yogurt whereas previously ate regular dairy yogurt for breakfast. She has also been increase the variety of vegetables she is eating and getting more fiber overall in her diet. She adds giovana seeds to her non dairy yogurt and has blueberries in it as well. She has been feeling less stressed lately as well. She feels that stress plays a big role in her symptoms as well. She recently got  so reported that this caused a lot of stress. She also tries to avoid/limit added sugars in her diet. This past week has been eating some halloween candy so having some stomach pain/issues, but overall still doing well. She also decreased her broccoli intake as she used to eat every night with dinner but now has a different vegetable and just a smaller amount of broccoli at a meal. She used to eat plain lettuce and carrot salad but now having mostly spinach leaves along with lettuce, carrots, peppers and other vegetables. She has been eating more meat, switched from chicken to  "now eating beef at night and she also feels this is well tolerated, possibly helping. She has not eaten out since before meeting with GI. The last time she ate out at a mexican restaurant she has to run to the bathroom right away/had incontinence. She loves luis m pizzas from punch pizza but has issues/loose stools with eating it. When she used to eat it, she would eat the whole pizza. She loves chipotle burritos but has not eaten these lately either.     Nutrition Plan:  Discussed reintroducing some foods and process (phase 2 of low FODMAP diet) to help liberalize diet and to see if she can identify any specific food triggers for her. Encouraged continue to increase/eat a higher fiber diet, especially including some soluble fibers in diet (as she is currently doing). Messaging with low fodmap recipe sites and mailing out fodmap handouts/packet to her. See goals below.    Patient Active Problem List   Diagnosis    Anxiety disorder    Attention deficit hyperactivity disorder (ADHD)    Dysmenorrhea    Dermatographia     Height:   Ht Readings from Last 1 Encounters:   09/21/23 1.735 m (5' 8.31\")   ]  Weight:  Wt Readings from Last 10 Encounters:   09/21/23 61.2 kg (135 lb)   04/13/22 59.2 kg (130 lb 8 oz)   01/12/22 56.2 kg (124 lb)   08/11/21 58.1 kg (128 lb)   01/18/21 60.8 kg (134 lb)   09/30/20 59.6 kg (131 lb 8 oz)   08/20/20 58.7 kg (129 lb 8 oz)   02/19/20 59.9 kg (132 lb)   01/29/20 60.3 kg (133 lb)     BMI: Estimated body mass index is 20.34 kg/m  as calculated from the following:    Height as of 9/21/23: 1.735 m (5' 8.31\").    Weight as of 9/21/23: 61.2 kg (135 lb).    Diet Recall:  (some usual/recent meals/snacks/beverages): eats 3 meals per day.  Previously ate some dairy (creamer in coffee and regular yogurt, but discontinued these. She has been following  Meal Food    Breakfast Dairy free yogurt, fruits (blueberries), sometimes eggs too   Lunch Salad (spinach, lettuce, carrots and more variety of vegs) " "  Dinner Switched to beef, veg, bean pasta noodle   Snacks Not usually   Beverages Decaf coffee (discontinued creamer), ~3 pints water, chamomile/herbal tea, occas ~2x/week sparkling water (these upset her stomach though)   Alcohol Intake Approx 1 drink (wine) twice per week, (upsets her stomach)     Frequency of eating/taking out meals: none since meet with GI but prev 2 times per week.     Labs:    Last Comprehensive Metabolic Panel:  No results found for: \"NA\", \"POTASSIUM\", \"CHLORIDE\", \"CO2\", \"ANIONGAP\", \"GLC\", \"BUN\", \"CR\", \"GFRESTIMATED\", \"HOLDEN\"  CBC RESULTS: No results for input(s): \"WBC\", \"RBC\", \"HGB\", \"HCT\", \"MCV\", \"MCH\", \"MCHC\", \"RDW\", \"PLT\" in the last 92315 hours.    Pertinent Medications/vitamin and mineral supplements:      Current Outpatient Medications   Medication    etonogestrel (NEXPLANON) 68 MG IMPL     No current facility-administered medications for this visit.     Food Allergies:  NKFA    MALNUTRITION:  % Weight Loss:  None noted  % Intake:  No decreased intake noted  Subcutaneous Fat Loss:  None observed  Muscle Loss:  None observed  Fluid Retention:  None noted    Malnutrition Diagnosis: Patient does not meet two of the above criteria necessary for diagnosing malnutrition  In Context of:  Acute illness or injury  Chronic illness or disease    Nutrition Prescription: Nutrition Education     Nutrition Intervention      Provided diet education for loose stools/diarrhea, reviewed low fodmap diet and explained 3 phases. Encouraged her to reintroduce higher fodmap foods back into diet to see if tolerated. Discussed reintroduction process. Encouraged good fiber intake, especially some soluble fiber in diet. Discussed some tips for eating/taking out foods. Encouraged her to try smaller portions if eating/taking out a meal and save the other third/half for leftovers.     Answered patient's questions. Patient verbalized understanding of education provided. See Goals below.     Educational Materials " Provided:  Nutrition Care Manual: Low FODMAP nutrition therapy and FODMAP food chart     Goals:    Start/continue to reintroduce some higher FODMAP foods back into your diet.  Here is the reintroduction process:  --Re-introduce one food at a time by eating a small serving of that food each day for 3 days in a row OR you can gradually increase the portion size over the course of the 3 days. For example on day 1, can have 1/4 cup serving, then on day 2, can have 1/3 c and then on day 3, can have a 1/2 cup serving.    -If tolerated, then wait at least one day, and then add another higher FODMAP food back into diet for 3 days in a row and onward.     -If you prefer to reintroduce foods slowly, then you could add one food each week (depending on how quickly you'd like to re-introduce and also depending on if you have a return of symptoms that may linger for a day or so).    -If you notice any symptoms after adding back in a higher FODMAP food than can wait until symptoms improve before trying the next higher FODMAP food.     -Also if you notice a significant increase in symptoms after retrying the first day, you do not need to eat on additional days.    2. Keep daily food and beverage journals and track all symptoms you experience with reintroducing.    Here are some websites with low FODMAP recipes:    Www.fodmapeverRockerbox.Kiko  Www.AppSurfer  IBSfree.net    Loved.la at www.[employer] sells ready made low FODMAP meals. Here is a coupon code for 60% off some meals. Code = 60off    3. Continue to gradually increase fiber in your diet/continue to eat a higher fiber diet. Especially continue to include good sources of soluble fiber in your diet. Here is a list of some good sources of soluble fiber: giovana seeds, flaxseeds, avocado, oats, pears, apples, berries, banana, barley, beans/legumes, figs, sweet potato.    4. Continue to drink at least 3-4 pints (48 oz-64 oz) or more water per day.    5. If eating or  taking out a meal, eat a small portion of the meal such as 1/3 to 1/2 of the meal or if retrying punch luis m pizza, try having 1-2 slices along with a salad instead of the whole pizza and take the rest home for leftovers to see if this is better tolerated versus eating the whole meal at on time. (Can start with taking out some meals that you like and eating at home at first to see how you feel).    Nutrition Monitoring and Evaluation: Will monitor adherence to nutrition recommendations at future RD visits.     Further Medical Nutrition Therapy:  Follow-up as needed.     Patient was encouraged to contact RD with any further questions.    Debbie Degroot, MS, RD, LD

## 2023-11-08 NOTE — NURSING NOTE
Is the patient currently in the state of [place]? YES    Visit mode:VIDEO    If the visit is dropped, the patient can be reconnected by: VIDEO VISIT: Text to cell phone:   Telephone Information:   Mobile [phone]       Will anyone else be joining the visit? NO  (If patient encounters technical issues they should call [phone]    How would you like to obtain your AVS? MyChart    Are changes needed to the allergy or medication list? N/A    Reason for visit: Consult    Cloe NINO

## 2023-11-08 NOTE — LETTER
11/8/2023         RE: Joelle Molinagio  [address]  [place] [address]        Dear Colleague,    Thank you for referring your patient, Joelle Enriquez, to the [facility] [place]. Please see a copy of my visit note below.    Virtual Visit Details    Type of service:  Video Visit     Originating Location (pt. Location): Home  Distant Location (provider location):  Off-site  Platform used for Video Visit: [facility] Outpatient Medical Nutrition Therapy      Time Spent:  40 minutes  Session Type:  Initial   Referring Physician:  Brigette Erazo PA-C  Reason for RD Visit:   loose stools    Nutrition Assessment:  Patient is a 28 year old female with history that includes ADHD, anxiety, loose stools, functional diarrhea. She stated that prior to meeting with her GI provider, her PCP had given her a first list for IBS/low fodmap food list. She followed this list and also was very restrictive with her food choices. She ate chicken, rice and broccoli every night for dinner for example. After meeting with GI provider, she started taking citrucel and taking 2 T per day. This has been extremely helpful. Additionally she discontinued eating dairy and switched to a non dairy yogurt whereas previously ate regular dairy yogurt for breakfast. She has also been increase the variety of vegetables she is eating and getting more fiber overall in her diet. She adds giovana seeds to her non dairy yogurt and has blueberries in it as well. She has been feeling less stressed lately as well. She feels that stress plays a big role in her symptoms as well. She recently got  so reported that this caused a lot of stress. She also tries to avoid/limit added sugars in her diet. This past week has been eating some halloween candy so having some stomach pain/issues, but overall still doing well. She also decreased her broccoli intake as she used to eat every night with dinner but  "now has a different vegetable and just a smaller amount of broccoli at a meal. She used to eat plain lettuce and carrot salad but now having mostly spinach leaves along with lettuce, carrots, peppers and other vegetables. She has been eating more meat, switched from chicken to now eating beef at night and she also feels this is well tolerated, possibly helping. She has not eaten out since before meeting with GI. The last time she ate out at a mexican restaurant she has to run to the bathroom right away/had incontinence. She loves luis m pizzas from punch pizza but has issues/loose stools with eating it. When she used to eat it, she would eat the whole pizza. She loves chipotle burritos but has not eaten these lately either.     Nutrition Plan:  Discussed reintroducing some foods and process (phase 2 of low FODMAP diet) to help liberalize diet and to see if she can identify any specific food triggers for her. Encouraged continue to increase/eat a higher fiber diet, especially including some soluble fibers in diet (as she is currently doing). Messaging with low fodmap recipe sites and mailing out fodmap handouts/packet to her. See goals below.    Patient Active Problem List   Diagnosis    Anxiety disorder    Attention deficit hyperactivity disorder (ADHD)    Dysmenorrhea    Dermatographia     Height:   Ht Readings from Last 1 Encounters:   09/21/23 1.735 m (5' 8.31\")   ]  Weight:  Wt Readings from Last 10 Encounters:   09/21/23 61.2 kg (135 lb)   04/13/22 59.2 kg (130 lb 8 oz)   01/12/22 56.2 kg (124 lb)   08/11/21 58.1 kg (128 lb)   01/18/21 60.8 kg (134 lb)   09/30/20 59.6 kg (131 lb 8 oz)   08/20/20 58.7 kg (129 lb 8 oz)   02/19/20 59.9 kg (132 lb)   01/29/20 60.3 kg (133 lb)     BMI: Estimated body mass index is 20.34 kg/m  as calculated from the following:    Height as of 9/21/23: 1.735 m (5' 8.31\").    Weight as of 9/21/23: 61.2 kg (135 lb).    Diet Recall:  (some usual/recent meals/snacks/beverages): eats 3 " "meals per day.  Previously ate some dairy (creamer in coffee and regular yogurt, but discontinued these. She has been following  Meal Food    Breakfast Dairy free yogurt, fruits (blueberries), sometimes eggs too   Lunch Salad (spinach, lettuce, carrots and more variety of vegs)   Dinner Switched to beef, veg, bean pasta noodle   Snacks Not usually   Beverages Decaf coffee (discontinued creamer), ~3 pints water, chamomile/herbal tea, occas ~2x/week sparkling water (these upset her stomach though)   Alcohol Intake Approx 1 drink (wine) twice per week, (upsets her stomach)     Frequency of eating/taking out meals: none since meet with GI but prev 2 times per week.     Labs:    Last Comprehensive Metabolic Panel:  No results found for: \"NA\", \"POTASSIUM\", \"CHLORIDE\", \"CO2\", \"ANIONGAP\", \"GLC\", \"BUN\", \"CR\", \"GFRESTIMATED\", \"HOLDEN\"  CBC RESULTS: No results for input(s): \"WBC\", \"RBC\", \"HGB\", \"HCT\", \"MCV\", \"MCH\", \"MCHC\", \"RDW\", \"PLT\" in the last 26132 hours.    Pertinent Medications/vitamin and mineral supplements:      Current Outpatient Medications   Medication    etonogestrel (NEXPLANON) 68 MG IMPL     No current facility-administered medications for this visit.     Food Allergies:  NKFA    MALNUTRITION:  % Weight Loss:  None noted  % Intake:  No decreased intake noted  Subcutaneous Fat Loss:  None observed  Muscle Loss:  None observed  Fluid Retention:  None noted    Malnutrition Diagnosis: Patient does not meet two of the above criteria necessary for diagnosing malnutrition  In Context of:  Acute illness or injury  Chronic illness or disease    Nutrition Prescription: Nutrition Education     Nutrition Intervention      Provided diet education for loose stools/diarrhea, reviewed low fodmap diet and explained 3 phases. Encouraged her to reintroduce higher fodmap foods back into diet to see if tolerated. Discussed reintroduction process. Encouraged good fiber intake, especially some soluble fiber in diet. Discussed some tips " for eating/taking out foods. Encouraged her to try smaller portions if eating/taking out a meal and save the other third/half for leftovers.     Answered patient's questions. Patient verbalized understanding of education provided. See Goals below.     Educational Materials Provided:  Nutrition Care Manual: Low FODMAP nutrition therapy and FODMAP food chart     Goals:    Start/continue to reintroduce some higher FODMAP foods back into your diet.  Here is the reintroduction process:  --Re-introduce one food at a time by eating a small serving of that food each day for 3 days in a row OR you can gradually increase the portion size over the course of the 3 days. For example on day 1, can have 1/4 cup serving, then on day 2, can have 1/3 c and then on day 3, can have a 1/2 cup serving.    -If tolerated, then wait at least one day, and then add another higher FODMAP food back into diet for 3 days in a row and onward.     -If you prefer to reintroduce foods slowly, then you could add one food each week (depending on how quickly you'd like to re-introduce and also depending on if you have a return of symptoms that may linger for a day or so).    -If you notice any symptoms after adding back in a higher FODMAP food than can wait until symptoms improve before trying the next higher FODMAP food.     -Also if you notice a significant increase in symptoms after retrying the first day, you do not need to eat on additional days.    2. Keep daily food and beverage journals and track all symptoms you experience with reintroducing.    Here are some websites with low FODMAP recipes:    Www.[employer].Qi[employer] Interaction Network  Www.[employer] Interaction Network  IBSfree.net    [employer] at www.[employer]eem.Qiyou Interaction Network sells ready made low FODMAP meals. Here is a coupon code for 60% off some meals. Code = 60off    3. Continue to gradually increase fiber in your diet/continue to eat a higher fiber diet. Especially continue to include good sources of soluble fiber in your  diet. Here is a list of some good sources of soluble fiber: giovana seeds, flaxseeds, avocado, oats, pears, apples, berries, banana, barley, beans/legumes, figs, sweet potato.    4. Continue to drink at least 3-4 pints (48 oz-64 oz) or more water per day.    5. If eating or taking out a meal, eat a small portion of the meal such as 1/3 to 1/2 of the meal or if retrying punch luis m pizza, try having 1-2 slices along with a salad instead of the whole pizza and take the rest home for leftovers to see if this is better tolerated versus eating the whole meal at on time. (Can start with taking out some meals that you like and eating at home at first to see how you feel).    Nutrition Monitoring and Evaluation: Will monitor adherence to nutrition recommendations at future RD visits.     Further Medical Nutrition Therapy:  Follow-up as needed.     Patient was encouraged to contact RD with any further questions.          Again, thank you for allowing me to participate in the care of your patient.      Sincerely,    Debbie Degroot RD

## 2023-11-08 NOTE — PATIENT INSTRUCTIONS
It was nice meeting you today. Below are the nutrition recommendations we discussed at your visit.    Please let me know if you have any additional questions.    Nutrition Recommendations    Start/continue to reintroduce some higher FODMAP foods back into your diet.  Here is the reintroduction process:  --Re-introduce one food at a time by eating a small serving of that food each day for 3 days in a row OR you can gradually increase the portion size over the course of the 3 days. For example on day 1, can have 1/4 cup serving, then on day 2, can have 1/3 c and then on day 3, can have a 1/2 cup serving.    -If tolerated, then wait at least one day, and then add another higher FODMAP food back into diet for 3 days in a row and onward.     -If you prefer to reintroduce foods slowly, then you could add one food each week (depending on how quickly you'd like to re-introduce and also depending on if you have a return of symptoms that may linger for a day or so).    -If you notice any symptoms after adding back in a higher FODMAP food than can wait until symptoms improve before trying the next higher FODMAP food.     -Also if you notice a significant increase in symptoms after retrying the first day, you do not need to eat on additional days.    2. Keep daily food and beverage journals and track all symptoms you experience with reintroducing.    Here are some websites with low FODMAP recipes:    Www.[employer] Long WirelessdmapeverVoxound.feedPack  Www.[employer].feedPack  IBSfree.net    [employer] at www."[employer]" sells ready made low FODMAP meals. Here is a coupon code for 60% off some meals. Code = 60off    3. Continue to gradually increase fiber in your diet/continue to eat a higher fiber diet. Especially continue to include good sources of soluble fiber in your diet. Here is a list of some good sources of soluble fiber: giovana seeds, flaxseeds, avocado, oats, pears, apples, berries, banana, barley, beans/legumes, figs, sweet potato.    4.  Continue to drink at least 3-4 pints (48 oz-64 oz) or more water per day.    5. If eating or taking out a meal, eat a small portion of the meal such as 1/3 to 1/2 of the meal or if retrying punch luis m pizza, try having 1-2 slices along with a salad instead of the whole pizza and take the rest home for leftovers to see if this is better tolerated versus eating the whole meal at on time. (Can start with taking out some meals that you like and eating at home at first to see how you feel).    Can follow up as needed.    If you would like to schedule a follow up appointment, please call [phone].    Thank you,    Debbie Degroot, MS, RD, LD

## 2024-02-08 ENCOUNTER — MYC MEDICAL ADVICE (OUTPATIENT)
Dept: FAMILY MEDICINE | Facility: OTHER | Age: 29
End: 2024-02-08
Payer: COMMERCIAL

## 2024-02-10 NOTE — TELEPHONE ENCOUNTER
Schedulers and mychart will prompt, it is an office based procedure, so it will be scheduled by the same methods, but will prompt when scheduling.May need to make sure she gets the help she needs if not yet scheduled.  Nicole Szymanski MD

## 2024-02-23 ENCOUNTER — OFFICE VISIT (OUTPATIENT)
Dept: FAMILY MEDICINE | Facility: OTHER | Age: 29
End: 2024-02-23
Payer: COMMERCIAL

## 2024-02-23 VITALS
DIASTOLIC BLOOD PRESSURE: 75 MMHG | TEMPERATURE: 98.3 F | HEIGHT: 68 IN | HEART RATE: 88 BPM | BODY MASS INDEX: 22.2 KG/M2 | RESPIRATION RATE: 16 BRPM | SYSTOLIC BLOOD PRESSURE: 112 MMHG | OXYGEN SATURATION: 99 % | WEIGHT: 146.5 LBS

## 2024-02-23 DIAGNOSIS — Z30.46 ENCOUNTER FOR NEXPLANON REMOVAL: Primary | ICD-10-CM

## 2024-02-23 PROCEDURE — 11982 REMOVE DRUG IMPLANT DEVICE: CPT | Performed by: STUDENT IN AN ORGANIZED HEALTH CARE EDUCATION/TRAINING PROGRAM

## 2024-02-23 ASSESSMENT — ANXIETY QUESTIONNAIRES
1. FEELING NERVOUS, ANXIOUS, OR ON EDGE: SEVERAL DAYS
2. NOT BEING ABLE TO STOP OR CONTROL WORRYING: SEVERAL DAYS
5. BEING SO RESTLESS THAT IT IS HARD TO SIT STILL: SEVERAL DAYS
GAD7 TOTAL SCORE: 5
3. WORRYING TOO MUCH ABOUT DIFFERENT THINGS: SEVERAL DAYS
8. IF YOU CHECKED OFF ANY PROBLEMS, HOW DIFFICULT HAVE THESE MADE IT FOR YOU TO DO YOUR WORK, TAKE CARE OF THINGS AT HOME, OR GET ALONG WITH OTHER PEOPLE?: SOMEWHAT DIFFICULT
4. TROUBLE RELAXING: SEVERAL DAYS
IF YOU CHECKED OFF ANY PROBLEMS ON THIS QUESTIONNAIRE, HOW DIFFICULT HAVE THESE PROBLEMS MADE IT FOR YOU TO DO YOUR WORK, TAKE CARE OF THINGS AT HOME, OR GET ALONG WITH OTHER PEOPLE: SOMEWHAT DIFFICULT
6. BECOMING EASILY ANNOYED OR IRRITABLE: NOT AT ALL
GAD7 TOTAL SCORE: 5
7. FEELING AFRAID AS IF SOMETHING AWFUL MIGHT HAPPEN: NOT AT ALL
7. FEELING AFRAID AS IF SOMETHING AWFUL MIGHT HAPPEN: NOT AT ALL

## 2024-02-23 NOTE — PROGRESS NOTES
Nexplanon Removal:     Is a pregnancy test required: No.  Was a consent obtained?  Yes    Joelle Enriquez is here for removal of etonogestrel implant Nexplanon/Implanon    Indication: side effects - frequent menses and weight gain      Preoperative Diagnosis: etonogestrel implant  Postoperative Diagnosis: etonogestrel implant removed    Technique: On the left arm  Skin prep Betadine  Anesthesia 1% lidocaine  Procedure: Small incision (<5mm) was made at distal end of palpable implant, curved hemostat or mosquito forceps was used to isolate the implant and bring it to the incision, the fibrous capsule containing the implant  was incised and the Implant was removed intact.      EBL: minimal  Complications:  No  Tolerance:  Pt tolerated procedure well and was in stable condition.   Dressing:    A pressure bandage was placed for the next 12-24 hours.    Contraception was discussed and patient chose the following method unsure. She will notify primary care provider when she decides       Follow up: Pt was instructed to call if bleeding, severe pain or foul smell.     LA ANDERSON MD

## 2024-02-23 NOTE — PROGRESS NOTES
"  {PROVIDER CHARTING PREFERENCE:451491}    Subjective   Joelle is a 28 year old, presenting for the following health issues:  Nexplanon removal      2/23/2024     9:21 AM   Additional Questions   Roomed by Rosemarie PAUL   Accompanied by self     HPI Just here today for the Nexplanon removal, no other questions today.      {MA/LPN/RN Pre-Provider Visit Orders- hCG/UA/Strep (Optional):751401}  {SUPERLIST (Optional):956215}  {additonal problems for provider to add (Optional):715356}    {ROS Picklists (Optional):727940}      Objective    /75   Pulse 88   Temp 98.3  F (36.8  C) (Temporal)   Resp 16   Ht 1.724 m (5' 7.87\")   Wt 66.5 kg (146 lb 8 oz)   LMP 02/09/2024   SpO2 99%   BMI 22.36 kg/m    Body mass index is 22.36 kg/m .  Physical Exam   {Exam List (Optional):791812}    {Diagnostic Test Results (Optional):108635}        Signed Electronically by: LA ANDERSON MD  {Email feedback regarding this note to [email or web address]   :034540}  Answers submitted by the patient for this visit:  DAYSI-7 (Submitted on 2/23/2024)  DAYSI 7 TOTAL SCORE: 5    "

## 2024-02-23 NOTE — PATIENT INSTRUCTIONS
Wound Care Instructions     1.  Keep area dry today.     2.  Starting tomorrow wash gently with soap and water once daily (a gentle shower is okay - no direct shower spray to the wound).  Change the dressing daily. Do not soak it (no baths, hot tubs, pools, etc.). Make sure it is very dry after the shower/washing before placing a new bandage.    3.  Apply Vaseline or antibiotic ointment (Bacitracin is preferred) to the area and cover with a bandage (do this for 5-7 days until healed). Could consider gentle massage around the area daily for 8 weeks to potentially reduce scaring.      4. Protect the area from sun for up to one year afterward as the scar is continuing to remodel.  Sun exposure will also make the resulting scar more noticeable.     5.  Call if the area is very red, tender, has a discharge or if you have any other questions.  These may be signs of early infection

## 2024-05-24 ENCOUNTER — TELEPHONE (OUTPATIENT)
Dept: GASTROENTEROLOGY | Facility: CLINIC | Age: 29
End: 2024-05-24
Payer: COMMERCIAL

## 2024-05-24 NOTE — TELEPHONE ENCOUNTER
Brendar received a message from Brigette Erazo to help get Pt scheduled for a sooner GI appointment for a follow up. Brendar called and talked with the Pt. Pt accepted an appointment on 5/31/2024 at 10 AM for an in-person clinic visit.

## 2024-05-31 ENCOUNTER — OFFICE VISIT (OUTPATIENT)
Dept: GASTROENTEROLOGY | Facility: CLINIC | Age: 29
End: 2024-05-31
Payer: COMMERCIAL

## 2024-05-31 ENCOUNTER — TELEPHONE (OUTPATIENT)
Dept: GASTROENTEROLOGY | Facility: CLINIC | Age: 29
End: 2024-05-31

## 2024-05-31 ENCOUNTER — LAB (OUTPATIENT)
Dept: LAB | Facility: CLINIC | Age: 29
End: 2024-05-31
Payer: COMMERCIAL

## 2024-05-31 ENCOUNTER — TELEPHONE (OUTPATIENT)
Dept: ENDOCRINOLOGY | Facility: CLINIC | Age: 29
End: 2024-05-31

## 2024-05-31 VITALS
DIASTOLIC BLOOD PRESSURE: 73 MMHG | BODY MASS INDEX: 22.13 KG/M2 | HEART RATE: 87 BPM | SYSTOLIC BLOOD PRESSURE: 104 MMHG | WEIGHT: 141 LBS | HEIGHT: 67 IN | OXYGEN SATURATION: 100 %

## 2024-05-31 DIAGNOSIS — R10.13 EPIGASTRIC DISCOMFORT: Primary | ICD-10-CM

## 2024-05-31 DIAGNOSIS — R10.13 EPIGASTRIC DISCOMFORT: ICD-10-CM

## 2024-05-31 DIAGNOSIS — R12 HEARTBURN: ICD-10-CM

## 2024-05-31 LAB — LIPASE SERPL-CCNC: 30 U/L (ref 13–60)

## 2024-05-31 PROCEDURE — 36415 COLL VENOUS BLD VENIPUNCTURE: CPT | Performed by: PATHOLOGY

## 2024-05-31 PROCEDURE — 83690 ASSAY OF LIPASE: CPT | Performed by: PATHOLOGY

## 2024-05-31 PROCEDURE — G2211 COMPLEX E/M VISIT ADD ON: HCPCS | Performed by: PHYSICIAN ASSISTANT

## 2024-05-31 PROCEDURE — 99214 OFFICE O/P EST MOD 30 MIN: CPT | Performed by: PHYSICIAN ASSISTANT

## 2024-05-31 RX ORDER — OMEPRAZOLE 40 MG/1
40 CAPSULE, DELAYED RELEASE ORAL DAILY
Qty: 90 CAPSULE | Refills: 0 | Status: SHIPPED | OUTPATIENT
Start: 2024-05-31

## 2024-05-31 ASSESSMENT — PAIN SCALES - GENERAL: PAINLEVEL: MILD PAIN (2)

## 2024-05-31 NOTE — TELEPHONE ENCOUNTER
Patient confirmed scheduled appointment:  Date: 8/6/24  Time: 8 am  Visit type: RET GI  Provider: Brigette Erazo  Location: [facility]  Testing/imaging: n/a  Additional notes: n/a

## 2024-05-31 NOTE — PATIENT INSTRUCTIONS
It was a pleasure taking care of you today.  I've included a brief summary of our discussion and care plan from today's visit below.  Please review this information with your primary care provider.  _______________________________________________________________________    My recommendations are summarized as follows:    Let's check for h.pylori.  the kit today and after holding the omeprazole for 14 days, collect a sample and bring it to [facility] [place] [facility]  Can collect fecal calpro but I don't think we need the enteric panel any more   After you leave the sample, I want you to start daily omeprazole (high dose). Take 30-60 min before breakfast on empty stomach x 12 weeks.   Please get labs drawn today downstairs   I want to get an ultrasound, please call to schedule at [phone]   Continue the powder fiber as you are doing - I'm glad the lower GI symptoms are better     Please call our clinic or send a KidzVuzt message to us if you have any questions or concerns. MyChart messages are answered by your nurse or doctor typically within 24 hours.  Please allow extra time on weekends and holidays    Return to GI Clinic in 2-3 months to review your progress.    _______________________________________________________________________    How do I schedule labs, imaging studies, or procedures that were ordered in clinic today?      Labs: To schedule lab appointment at the [facility], use my chart or call [phone]. If you have a [place] lab closer to home where you are regularly seen you can give them a call.      Procedures: If a colonoscopy, upper endoscopy, breath test, esophageal manometry, or pH impedence was ordered today, our endoscopy team will call you to schedule this. If you have not heard from our endoscopy team within a week, please call [phone] option 2 to schedule.      Imaging Studies: If you were scheduled for a CT scan, X-ray, MRI, ultrasound, HIDA scan or other  imaging study, please call [phone] to have this scheduled.      Referral: If a referral to another specialty was ordered, expect a phone call or follow instructions above. If you have not heard from anyone regarding your referral in a week, please call our clinic to check the status.      Who do I call with any questions after my visit?  Please be in touch if there are any further questions that arise following today's visit.  There are multiple ways to contact your gastroenterology care team.       During business hours, you may reach a Gastroenterology nurse at [phone]     To schedule or reschedule an appointment, please call [phone].      You can always send a secure message through HelloSign.  HelloSign messages are answered by your nurse or doctor typically within 24 hours.  Please allow extra time on weekends and holidays.       For urgent/emergent questions after business hours, you may reach the on-call GI Fellow by contacting the [facility]  at [phone].     How will I get the results of any tests ordered?    You will receive all of your results.  If you have signed up for Shipwiret, any tests ordered at your visit will be available to you after your physician reviews them.  Typically this takes 1-2 weeks.  If there are urgent results that require a change in your care plan, your physician or nurse will call you to discuss the next steps.       What is HelloSign?  HelloSign is a secure way for you to access all of your healthcare records from the [facility].  It is a web based computer program, so you can sign on to it from any location.  It also allows you to send secure messages to your care team.  I recommend signing up for HelloSign access if you have not already done so and are comfortable with using a computer.       How to I schedule a follow-up visit?  If you did not schedule a follow-up visit today, please call [phone] to schedule a follow-up office  visit.      Sincerely,    Brigette Erazo PA-C  Gastroenterology

## 2024-05-31 NOTE — TELEPHONE ENCOUNTER
Patient confirmed scheduled appointment:  Date: 5/31/24  Time: 11:30 am  Visit type: lab  Provider: lab  Location: [address] floor  Testing/imaging: n/a  Additional notes: n/a

## 2024-05-31 NOTE — NURSING NOTE
"chief complaint    Vitals:    05/31/24 0935   BP: 104/73   Pulse: 87   SpO2: 100%   Weight: 64 kg (141 lb)   Height: 1.702 m (5' 7\")       Body mass index is 22.08 kg/m .    Dee Espitia MA    "

## 2024-05-31 NOTE — LETTER
"    5/31/2024         RE: Joelle Enriquez  [address]  [place] [address]        Dear Colleague,    Thank you for referring your patient, Joelle Enriquez, to the [facility] [place]. Please see a copy of my visit note below.      GI CLINIC VISIT    HPI  Joelle Enriquez is a 28 year old year old female with PMHx of ADHD following with the [facility] GI group for diarrhea. She est'd with me 9/2023, please see that note for full details.     5/31/24    I initially saw her in Sept of 2023 for lower GI sx - these seem to have normalized after she started taking a daily fiber supplement with citrucel. Bowels are regular and formed w/o blood or black. 1 BSC type 4 daily. No abd pain.   Her largest issue is that of heartburn sx that started 2 mo ago.   She feels \"her stomach is not working.\" When it gets back, hard to lay down and for 2 months; but ongoing for past 7 years, felt like she needed to go for a 3 mile walk in order to calm down her stomach. Has a lot of pressure   She has been taking prilosec 20 mg for 14 days which she feels has been very helpful but stopped it in favor of famotidine 20 mg at bedtime. Not as helpful as prilosec.   Still had breakthrough sx despite daily prilosec but it's not as intense.   Her mouth is constantly salty   If she ate anything like caffeine, alcohol, stress robert have breakthrough heartburn sx and pain.   She got a new birth control implant and got it removed - after this, she   Her pressure starts 2-3 hours after eating.   Epigasric discomfort and pressure   Appetite/weight are stable. She gained 20# on birth control then she lost about 7# since being off BC.   No nausea/vomiting.   Stooling - harder stools, 1 BSC type 4 today. Good evacuation. No longer having diarrhea like before.   Citrucel powder 1 scoop a day   No lower abd pain.     ROS  Denies fevers, chills, weight loss, nausea, emesis, dysphagia, odynophagia, regular heartburn, " regurgitation, abdominal pain, black tarrying stools, bloody stools, rectal itching.     Family Hx    Paternal aunt with celiac disease - hives on her body   No other known family history or GI related malignancy (esophageal, gastric, pancreatic, liver or colon) or family history of IBD/celiac disease.     Social Hx     Occ use of NSAIDs or Tylenol.   Monthly Midol and ipburpron PM at night time x 3 days   No use of OTC herbal supplements/weight loss products.      Occ 1-2 beer once weekly ETOH  No tobacco products.   No No recreational drug use.     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    PREVIOUS SURGERIES:  No past surgical history on file.    ALLERGIES:   No Known Allergies    PERTINENT MEDICATIONS:  No current outpatient medications on file.    SOCIAL HISTORY:  Social History     Socioeconomic History    Marital status:      Spouse name: Not on file    Number of children: Not on file    Years of education: Not on file    Highest education level: Not on file   Occupational History    Not on file   Tobacco Use    Smoking status: Never    Smokeless tobacco: Never   Vaping Use    Vaping status: Never Used   Substance and Sexual Activity    Alcohol use: Yes     Comment: 1 a drink a day    Drug use: Never    Sexual activity: Yes     Partners: Male   Other Topics Concern    Not on file   Social History Narrative    Not on file     Social Determinants of Health     Financial Resource Strain: Low Risk  (9/21/2023)    Financial Resource Strain     Within the past  "12 months, have you or your family members you live with been unable to get utilities (heat, electricity) when it was really needed?: No   Food Insecurity: Low Risk  (9/21/2023)    Food Insecurity     Within the past 12 months, did you worry that your food would run out before you got money to buy more?: No     Within the past 12 months, did the food you bought just not last and you didn t have money to get more?: No   Transportation Needs: Low Risk  (9/21/2023)    Transportation Needs     Within the past 12 months, has lack of transportation kept you from medical appointments, getting your medicines, non-medical meetings or appointments, work, or from getting things that you need?: No   Physical Activity: Not on file   Stress: Not on file   Social Connections: Not on file   Interpersonal Safety: Low Risk  (9/21/2023)    Interpersonal Safety     Do you feel physically and emotionally safe where you currently live?: Yes     Within the past 12 months, have you been hit, slapped, kicked or otherwise physically hurt by someone?: No     Within the past 12 months, have you been humiliated or emotionally abused in other ways by your partner or ex-partner?: No   Housing Stability: Low Risk  (9/21/2023)    Housing Stability     Do you have housing? : Yes     Are you worried about losing your housing?: No     FAMILY HISTORY:  No family history on file.    Past/family/social history reviewed and no changes    PHYSICAL EXAMINATION:  Vitals reviewed: /73   Pulse 87   Ht 1.702 m (5' 7\")   Wt 64 kg (141 lb)   SpO2 100%   BMI 22.08 kg/m    Wt:   Wt Readings from Last 2 Encounters:   05/31/24 64 kg (141 lb)   02/23/24 66.5 kg (146 lb 8 oz)      Video physical exam  General: Patient appears well in no acute distress.   Skin: No visualized rash or lesions on visualized skin  Eyes: EOMI, no erythema, sclera icterus or discharge noted  Resp: Appears to be breathing comfortably without accessory muscle usage, speaking in full " sentences, no cough  MSK: Appears to have normal range of motion based on visualized movements  Neurologic: No apparent tremors, facial movements symmetric  Psych: affect normal, alert and oriented    PERTINENT STUDIES:    Office Visit on 09/21/2023   Component Date Value Ref Range Status    Interpretation 09/21/2023 Negative for Intraepithelial Lesion or Malignancy (NILM)    Final    Comment 09/21/2023    Final                    Value:This result contains rich text formatting which cannot be displayed here.    Specimen Adequacy 09/21/2023 Satisfactory for evaluation, endocervical/transformation zone component present   Final    Clinical Information 09/21/2023    Final                    Value:This result contains rich text formatting which cannot be displayed here.    Reflex Testing 09/21/2023 Yes regardless of result   Final    Previous Abnormal? 09/21/2023    Final                    Value:This result contains rich text formatting which cannot be displayed here.    Performing Labs 09/21/2023    Final                    Value:This result contains rich text formatting which cannot be displayed here.    hCG Urine Qualitative 09/21/2023 Negative  Negative Final    Cholesterol 09/21/2023 126  <200 mg/dL Final    Triglycerides 09/21/2023 56  <150 mg/dL Final    Direct Measure HDL 09/21/2023 63  >=50 mg/dL Final    LDL Cholesterol Calculated 09/21/2023 52  <=100 mg/dL Final    Non HDL Cholesterol 09/21/2023 63  <130 mg/dL Final        Lab Results   Component Value Date    CHOL 126 09/21/2023    TRIG 56 09/21/2023    HDL 63 09/21/2023        Liver Function Studies - No results for input(s): PROTTOTAL, ALBUMIN, BILITOTAL, ALKPHOS, AST, ALT, BILIDIRECT in the last 82379 hours.     PREVIOUS ENDOSCOPY    No results found for this or any previous visit.    ASSESSMENT/PLAN:  Joelle Enriquez is a 28 year old year old female with PMHx significant for ADHD and remote dx of IBS who [facility]  di[facility]. She reported chronic episodes of diarrhea with flares associated with increased stooling and changes in stool consistency. Triggers include coffee, chinese/mexican/pizza, and notably stress. For many years she self-initiated a very restrictive diet which seemed to have helped her lower GI symptoms. FH significant for celiac disease in P Aunt. She started a daily bowel supplement with much improvement in her sx. Today her largest concern is that of new heartburn.     #Chronic diarrhea with once weekly flares of increased stooling and changes in consistency    Presenting symptoms are most consistent with IBS-D however she denies predominate abdominal pain component. DDX also includes functional diarrhea, IBD (particularly to rectum / lower colon given her report of pain)/inflammatory conditions, malabsorption (celiac, lactose, fructose, SIBO less likely EPI), infection, hyperthyroidism vs other    -I advised she get work up that we initiated back in 9/2023 for completion (please see that note)  -however she has had a significant improvement in her sx with start of daily fiber, advised continue   -consider GI RD consult, if she feels would be helpful     Future consideration  1. IB Silvia trial. Can consider scheduled Imodium if infectious stool panel negative.   2. SIBO evaluation   3. Start of TCA or other neuromodulator  4. Referral to health PsyD team     #heartburn  Sx suggestive of GERD with ddx h.pylori infection, gastritis, functional disease, hepatobiliary disease, pancreatic disease vs other.     -ENMANUEL US  -lipase ordered, though sx not classic for pancreatitis  -I advised she complete h.pylori stool Ag, order is active in system. After completion, start omperazole 40 mg once daily x 8-12 weeks    Future consideration  -HIDA   -EGD if sx continue despite PPI trial     Orders Placed This Encounter   Procedures    Lipase     Standing Status:   Future     Number of Occurrences:   1     Standing Expiration  Date:   2025     Colorectal cancer screenin, unless otherwise clinically indicated     RTC 8-12 wk, or sooner PRN     Thank you for this consultation.  It was a pleasure to participate in the care of this patient; please contact me with any further questions.     Follow up: As planned above. Today, I personally spent 35 minutes in direct face to face time with the patient, of which greater than 50% of the time was spent in patient education and counseling as described above. Approximately minutes were spent on indirect care associated with the patient's consultation including but not limited to review of: patient medical records to date, clinic visits, hospital records, lab results, imaging studies, procedural documentation, and coordinating care with other providers. The findings from this review are summarized in the above note. All of the above accounted for a cumulative time of 35 minutes and was performed on the date of service.         Again, thank you for allowing me to participate in the care of your patient.      Sincerely,    Brigette Erazo PA-C

## 2024-05-31 NOTE — TELEPHONE ENCOUNTER
Patient confirmed scheduled appointment:  Date: 6/3/24  Time: 9:40 am  Visit type: imaging  Provider: imaging  Location: [place]  Testing/imaging: US Abdomen limited  Additional notes: n/a

## 2024-05-31 NOTE — PROGRESS NOTES
"  GI CLINIC VISIT    HPI  Joelle Enriquez is a 28 year old year old female with PMHx of ADHD following with the [facility] GI group for diarrhea. She est'd with me 9/2023, please see that note for full details.     5/31/24    I initially saw her in Sept of 2023 for lower GI sx - these seem to have normalized after she started taking a daily fiber supplement with citrucel. Bowels are regular and formed w/o blood or black. 1 BSC type 4 daily. No abd pain.   Her largest issue is that of heartburn sx that started 2 mo ago.   She feels \"her stomach is not working.\" When it gets back, hard to lay down and for 2 months; but ongoing for past 7 years, felt like she needed to go for a 3 mile walk in order to calm down her stomach. Has a lot of pressure   She has been taking prilosec 20 mg for 14 days which she feels has been very helpful but stopped it in favor of famotidine 20 mg at bedtime. Not as helpful as prilosec.   Still had breakthrough sx despite daily prilosec but it's not as intense.   Her mouth is constantly salty   If she ate anything like caffeine, alcohol, stress robert have breakthrough heartburn sx and pain.   She got a new birth control implant and got it removed - after this, she   Her pressure starts 2-3 hours after eating.   Epigasric discomfort and pressure   Appetite/weight are stable. She gained 20# on birth control then she lost about 7# since being off BC.   No nausea/vomiting.   Stooling - harder stools, 1 BSC type 4 today. Good evacuation. No longer having diarrhea like before.   Citrucel powder 1 scoop a day   No lower abd pain.     ROS  Denies fevers, chills, weight loss, nausea, emesis, dysphagia, odynophagia, regular heartburn, regurgitation, abdominal pain, black tarrying stools, bloody stools, rectal itching.     Family Hx    Paternal aunt with celiac disease - hives on her body   No other known family history or GI related malignancy (esophageal, gastric, pancreatic, liver or colon) or family " history of IBD/celiac disease.     Social Hx     Occ use of NSAIDs or Tylenol.   Monthly Midol and ipburpron PM at night time x 3 days   No use of OTC herbal supplements/weight loss products.      Occ 1-2 beer once weekly ETOH  No tobacco products.   No No recreational drug use.     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    PREVIOUS SURGERIES:  No past surgical history on file.    ALLERGIES:   No Known Allergies    PERTINENT MEDICATIONS:  No current outpatient medications on file.    SOCIAL HISTORY:  Social History     Socioeconomic History    Marital status:      Spouse name: Not on file    Number of children: Not on file    Years of education: Not on file    Highest education level: Not on file   Occupational History    Not on file   Tobacco Use    Smoking status: Never    Smokeless tobacco: Never   Vaping Use    Vaping status: Never Used   Substance and Sexual Activity    Alcohol use: Yes     Comment: 1 a drink a day    Drug use: Never    Sexual activity: Yes     Partners: Male   Other Topics Concern    Not on file   Social History Narrative    Not on file     Social Determinants of Health     Financial Resource Strain: Low Risk  (9/21/2023)    Financial Resource Strain     Within the past 12 months, have you or your family members you live with been unable to get utilities (heat, electricity) when it was really needed?: No   Food Insecurity: Low Risk  (9/21/2023)    Food Insecurity     Within the past 12 months, did you worry that your food would run out  "before you got money to buy more?: No     Within the past 12 months, did the food you bought just not last and you didn t have money to get more?: No   Transportation Needs: Low Risk  (9/21/2023)    Transportation Needs     Within the past 12 months, has lack of transportation kept you from medical appointments, getting your medicines, non-medical meetings or appointments, work, or from getting things that you need?: No   Physical Activity: Not on file   Stress: Not on file   Social Connections: Not on file   Interpersonal Safety: Low Risk  (9/21/2023)    Interpersonal Safety     Do you feel physically and emotionally safe where you currently live?: Yes     Within the past 12 months, have you been hit, slapped, kicked or otherwise physically hurt by someone?: No     Within the past 12 months, have you been humiliated or emotionally abused in other ways by your partner or ex-partner?: No   Housing Stability: Low Risk  (9/21/2023)    Housing Stability     Do you have housing? : Yes     Are you worried about losing your housing?: No     FAMILY HISTORY:  No family history on file.    Past/family/social history reviewed and no changes    PHYSICAL EXAMINATION:  Vitals reviewed: /73   Pulse 87   Ht 1.702 m (5' 7\")   Wt 64 kg (141 lb)   SpO2 100%   BMI 22.08 kg/m    Wt:   Wt Readings from Last 2 Encounters:   05/31/24 64 kg (141 lb)   02/23/24 66.5 kg (146 lb 8 oz)      Video physical exam  General: Patient appears well in no acute distress.   Skin: No visualized rash or lesions on visualized skin  Eyes: EOMI, no erythema, sclera icterus or discharge noted  Resp: Appears to be breathing comfortably without accessory muscle usage, speaking in full sentences, no cough  MSK: Appears to have normal range of motion based on visualized movements  Neurologic: No apparent tremors, facial movements symmetric  Psych: affect normal, alert and oriented    PERTINENT STUDIES:    Office Visit on 09/21/2023   Component Date " Value Ref Range Status    Interpretation 09/21/2023 Negative for Intraepithelial Lesion or Malignancy (NILM)    Final    Comment 09/21/2023    Final                    Value:This result contains rich text formatting which cannot be displayed here.    Specimen Adequacy 09/21/2023 Satisfactory for evaluation, endocervical/transformation zone component present   Final    Clinical Information 09/21/2023    Final                    Value:This result contains rich text formatting which cannot be displayed here.    Reflex Testing 09/21/2023 Yes regardless of result   Final    Previous Abnormal? 09/21/2023    Final                    Value:This result contains rich text formatting which cannot be displayed here.    Performing Labs 09/21/2023    Final                    Value:This result contains rich text formatting which cannot be displayed here.    hCG Urine Qualitative 09/21/2023 Negative  Negative Final    Cholesterol 09/21/2023 126  <200 mg/dL Final    Triglycerides 09/21/2023 56  <150 mg/dL Final    Direct Measure HDL 09/21/2023 63  >=50 mg/dL Final    LDL Cholesterol Calculated 09/21/2023 52  <=100 mg/dL Final    Non HDL Cholesterol 09/21/2023 63  <130 mg/dL Final        Lab Results   Component Value Date    CHOL 126 09/21/2023    TRIG 56 09/21/2023    HDL 63 09/21/2023        Liver Function Studies - No results for input(s): PROTTOTAL, ALBUMIN, BILITOTAL, ALKPHOS, AST, ALT, BILIDIRECT in the last 54766 hours.     PREVIOUS ENDOSCOPY    No results found for this or any previous visit.    ASSESSMENT/PLAN:  Joelle Enriquez is a 28 year old year old female with PMHx significant for ADHD and remote dx of IBS who [facility]. She reported chronic episodes of diarrhea with flares associated with increased stooling and changes in stool consistency. Triggers include coffee, chinese/mexican/pizza, and notably stress. For many years she self-initiated a very restrictive diet which seemed  to have helped her lower GI symptoms. FH significant for celiac disease in P Aunt. She started a daily bowel supplement with much improvement in her sx. Today her largest concern is that of new heartburn.     #Chronic diarrhea with once weekly flares of increased stooling and changes in consistency    Presenting symptoms are most consistent with IBS-D however she denies predominate abdominal pain component. DDX also includes functional diarrhea, IBD (particularly to rectum / lower colon given her report of pain)/inflammatory conditions, malabsorption (celiac, lactose, fructose, SIBO less likely EPI), infection, hyperthyroidism vs other    -I advised she get work up that we initiated back in 2023 for completion (please see that note)  -however she has had a significant improvement in her sx with start of daily fiber, advised continue   -consider GI RD consult, if she feels would be helpful     Future consideration  1. IB Silvia trial. Can consider scheduled Imodium if infectious stool panel negative.   2. SIBO evaluation   3. Start of TCA or other neuromodulator  4. Referral to health PsyD team     #heartburn  Sx suggestive of GERD with ddx h.pylori infection, gastritis, functional disease, hepatobiliary disease, pancreatic disease vs other.     -ENMANUEL US  -lipase ordered, though sx not classic for pancreatitis  -I advised she complete h.pylori stool Ag, order is active in system. After completion, start omperazole 40 mg once daily x 8-12 weeks    Future consideration  -HIDA   -EGD if sx continue despite PPI trial     Orders Placed This Encounter   Procedures    Lipase     Standing Status:   Future     Number of Occurrences:   1     Standing Expiration Date:   2025     Colorectal cancer screenin, unless otherwise clinically indicated     RTC 8-12 wk, or sooner PRN     Thank you for this consultation.  It was a pleasure to participate in the care of this patient; please contact me with any further questions.      Brigette Erazo PA-C    Follow up: As planned above. Today, I personally spent 35 minutes in direct face to face time with the patient, of which greater than 50% of the time was spent in patient education and counseling as described above. Approximately minutes were spent on indirect care associated with the patient's consultation including but not limited to review of: patient medical records to date, clinic visits, hospital records, lab results, imaging studies, procedural documentation, and coordinating care with other providers. The findings from this review are summarized in the above note. All of the above accounted for a cumulative time of 35 minutes and was performed on the date of service.

## 2024-06-03 ENCOUNTER — ANCILLARY PROCEDURE (OUTPATIENT)
Dept: ULTRASOUND IMAGING | Facility: OTHER | Age: 29
End: 2024-06-03
Attending: PHYSICIAN ASSISTANT
Payer: COMMERCIAL

## 2024-06-03 ENCOUNTER — LAB (OUTPATIENT)
Dept: LAB | Facility: OTHER | Age: 29
End: 2024-06-03
Payer: COMMERCIAL

## 2024-06-03 DIAGNOSIS — R10.13 EPIGASTRIC DISCOMFORT: ICD-10-CM

## 2024-06-03 DIAGNOSIS — R19.5 LOOSE STOOLS: ICD-10-CM

## 2024-06-03 LAB
ALBUMIN SERPL BCG-MCNC: 4.6 G/DL (ref 3.5–5.2)
ALP SERPL-CCNC: 73 U/L (ref 40–150)
ALT SERPL W P-5'-P-CCNC: 21 U/L (ref 0–50)
ANION GAP SERPL CALCULATED.3IONS-SCNC: 9 MMOL/L (ref 7–15)
AST SERPL W P-5'-P-CCNC: 19 U/L (ref 0–45)
BILIRUB SERPL-MCNC: 0.3 MG/DL
BUN SERPL-MCNC: 13 MG/DL (ref 6–20)
CALCIUM SERPL-MCNC: 9.6 MG/DL (ref 8.6–10)
CHLORIDE SERPL-SCNC: 105 MMOL/L (ref 98–107)
CREAT SERPL-MCNC: 0.79 MG/DL (ref 0.51–0.95)
DEPRECATED HCO3 PLAS-SCNC: 25 MMOL/L (ref 22–29)
EGFRCR SERPLBLD CKD-EPI 2021: >90 ML/MIN/1.73M2
ERYTHROCYTE [DISTWIDTH] IN BLOOD BY AUTOMATED COUNT: 11.5 % (ref 10–15)
GLUCOSE SERPL-MCNC: 94 MG/DL (ref 70–99)
HCT VFR BLD AUTO: 40.5 % (ref 35–47)
HGB BLD-MCNC: 13.3 G/DL (ref 11.7–15.7)
MCH RBC QN AUTO: 30.2 PG (ref 26.5–33)
MCHC RBC AUTO-ENTMCNC: 32.8 G/DL (ref 31.5–36.5)
MCV RBC AUTO: 92 FL (ref 78–100)
PLATELET # BLD AUTO: 303 10E3/UL (ref 150–450)
POTASSIUM SERPL-SCNC: 5 MMOL/L (ref 3.4–5.3)
PROT SERPL-MCNC: 7.3 G/DL (ref 6.4–8.3)
RBC # BLD AUTO: 4.4 10E6/UL (ref 3.8–5.2)
SODIUM SERPL-SCNC: 139 MMOL/L (ref 135–145)
TSH SERPL DL<=0.005 MIU/L-ACNC: 1.34 UIU/ML (ref 0.3–4.2)
WBC # BLD AUTO: 7 10E3/UL (ref 4–11)

## 2024-06-03 PROCEDURE — 99000 SPECIMEN HANDLING OFFICE-LAB: CPT | Performed by: PATHOLOGY

## 2024-06-03 PROCEDURE — 87338 HPYLORI STOOL AG IA: CPT | Performed by: PHYSICIAN ASSISTANT

## 2024-06-03 PROCEDURE — 85027 COMPLETE CBC AUTOMATED: CPT

## 2024-06-03 PROCEDURE — 36415 COLL VENOUS BLD VENIPUNCTURE: CPT

## 2024-06-03 PROCEDURE — 86364 TISS TRNSGLTMNASE EA IG CLAS: CPT

## 2024-06-03 PROCEDURE — 84443 ASSAY THYROID STIM HORMONE: CPT

## 2024-06-03 PROCEDURE — 76705 ECHO EXAM OF ABDOMEN: CPT | Mod: TC | Performed by: RADIOLOGY

## 2024-06-03 PROCEDURE — 80053 COMPREHEN METABOLIC PANEL: CPT

## 2024-06-03 PROCEDURE — 82784 ASSAY IGA/IGD/IGG/IGM EACH: CPT

## 2024-06-03 PROCEDURE — 82306 VITAMIN D 25 HYDROXY: CPT

## 2024-06-04 LAB
H PYLORI AG STL QL IA: NEGATIVE
IGA SERPL-MCNC: 127 MG/DL (ref 84–499)
TTG IGA SER-ACNC: <0.2 U/ML
TTG IGG SER-ACNC: <0.6 U/ML
VIT D+METAB SERPL-MCNC: 30 NG/ML (ref 20–50)

## 2024-06-27 ENCOUNTER — MYC MEDICAL ADVICE (OUTPATIENT)
Dept: GASTROENTEROLOGY | Facility: CLINIC | Age: 29
End: 2024-06-27
Payer: COMMERCIAL

## 2024-07-17 ENCOUNTER — OFFICE VISIT (OUTPATIENT)
Dept: GASTROENTEROLOGY | Facility: CLINIC | Age: 29
End: 2024-07-17
Payer: COMMERCIAL

## 2024-07-17 VITALS
WEIGHT: 140 LBS | DIASTOLIC BLOOD PRESSURE: 68 MMHG | BODY MASS INDEX: 21.97 KG/M2 | HEIGHT: 67 IN | OXYGEN SATURATION: 100 % | HEART RATE: 77 BPM | SYSTOLIC BLOOD PRESSURE: 101 MMHG

## 2024-07-17 DIAGNOSIS — R10.13 EPIGASTRIC DISCOMFORT: ICD-10-CM

## 2024-07-17 DIAGNOSIS — R12 HEARTBURN: Primary | ICD-10-CM

## 2024-07-17 PROCEDURE — 99213 OFFICE O/P EST LOW 20 MIN: CPT | Performed by: PHYSICIAN ASSISTANT

## 2024-07-17 ASSESSMENT — PAIN SCALES - GENERAL: PAINLEVEL: NO PAIN (0)

## 2024-07-17 NOTE — LETTER
"7/17/2024      Joelle Enriquez  [address]  [place] [address]      Dear Colleague,    Thank you for referring your patient, Joelle Enriquez, to the [facility] [place]. Please see a copy of my visit note below.      GI CLINIC VISIT    HPI  Joelle Enriquez is a 29 year old year old female with PMHx of ADHD following with the [facility] GI group for diarrhea/heartburn. She est'd with me 9/2023, please see that note for full details.     Interval history  -H. pylori stool antigen negative off PPI  -Right upper quadrant ultrasound unremarkable  -TTG Ab NEG with intact total IgA   -CBC, CMP, TSH all WNL     7/17/24  Today she feels quite good.   Went on a trip to [place] - had a bit of an upset stomach when she got home from trip. Thinks stress was related to this   No further heartburn, no dysphagia/odynphagia, nausea/vomiting.  Still taking omeprazole 40 mg once daily.  No other medications including Pepcid.  Finds that raw onions and tomatoes make her symptoms worse.  Cooked red sauce is OK.   Stooling - not on citrucel as she has been busy with moving.  When she was taking fiber, she was stooling much more regularly  formed and manageable.  no lower abd pain and no blood or black in stools.  No diarrhea, stool test from 9/20/2023 not done  Appetite is stable. No unintentional weight loss.   No other questions or concerns    Family Hx  Paternal aunt with celiac disease - \"hives on her body\"  No other known family history or GI related malignancy (esophageal, gastric, pancreatic, liver or colon) or family history of IBD/celiac disease.     Social Hx     Occ use of NSAIDs or Tylenol.   Monthly Midol and ipburpron PM at night time x 3 days   No use of OTC herbal supplements/weight loss products.      Occ 1-2 beer once weekly ETOH  No tobacco products.   No No recreational drug use.     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    PREVIOUS SURGERIES:  No past surgical history on file.    ALLERGIES:   No Known Allergies    PERTINENT MEDICATIONS:    Current Outpatient Medications:      omeprazole (PRILOSEC) 40 MG DR capsule, Take 1 capsule (40 mg) by mouth daily Take 30-60 min on empty stomach before breakfast, Disp: 90 capsule, Rfl: 0    SOCIAL HISTORY:  Social History     Socioeconomic History     Marital status:      Spouse name: Not on file     Number of children: Not on file     Years of education: Not on file     Highest education level: Not on file   Occupational History     Not on file   Tobacco Use     Smoking status: Never     Smokeless tobacco: Never   Vaping Use     Vaping status: Never Used   Substance and Sexual Activity     Alcohol use: Yes     Comment: 1 a drink a day     Drug use: Never     Sexual activity: Yes     Partners: Male   Other Topics Concern     Not on file   Social History Narrative     Not on file     Social Determinants of Health     Financial Resource Strain: Low Risk  (9/21/2023)    Financial Resource Strain      Within the past 12 months, have you or your family members you live with been unable to get utilities (heat, electricity) when it was really needed?: No   Food Insecurity: Low Risk  (9/21/2023)    Food Insecurity      Within the past 12 months, did you worry that your food would run out before you got money to buy more?: No      Within the past 12 months, did the food you bought just not last and you didn t have money to get more?: No   Transportation Needs: Low Risk  (9/21/2023)     "Transportation Needs      Within the past 12 months, has lack of transportation kept you from medical appointments, getting your medicines, non-medical meetings or appointments, work, or from getting things that you need?: No   Physical Activity: Not on file   Stress: Not on file   Social Connections: Not on file   Interpersonal Safety: Low Risk  (9/21/2023)    Interpersonal Safety      Do you feel physically and emotionally safe where you currently live?: Yes      Within the past 12 months, have you been hit, slapped, kicked or otherwise physically hurt by someone?: No      Within the past 12 months, have you been humiliated or emotionally abused in other ways by your partner or ex-partner?: No   Housing Stability: Low Risk  (9/21/2023)    Housing Stability      Do you have housing? : Yes      Are you worried about losing your housing?: No     FAMILY HISTORY:  No family history on file.    Past/family/social history reviewed and no changes    PHYSICAL EXAMINATION:  Vitals reviewed: /68   Pulse 77   Ht 1.702 m (5' 7\")   Wt 63.5 kg (140 lb)   SpO2 100%   BMI 21.93 kg/m    Wt:   Wt Readings from Last 2 Encounters:   07/17/24 63.5 kg (140 lb)   05/31/24 64 kg (141 lb)   General: Patient appears well in no acute distress.   Skin: No visualized rash or lesions on visualized skin  ENT: EOMI, no erythema, sclera icterus or discharge noted. MMM without lesions or thrush.  Resp: CTAB. Appears to be breathing comfortably without accessory muscle usage, speaking in full sentences, no cough  CV: RRR  GI: soft, non-distended, non-tender.   MSK: Appears to have normal range of motion based on visualized movements  Neurologic: No apparent tremors, facial movements symmetric, CN II-XII grossly intact  Psych: affect normal, alert and oriented      PERTINENT STUDIES:    Office Visit on 09/21/2023   Component Date Value Ref Range Status     Interpretation 09/21/2023 Negative for Intraepithelial Lesion or Malignancy (NILM)   "  Final     Comment 09/21/2023    Final                    Value:This result contains rich text formatting which cannot be displayed here.     Specimen Adequacy 09/21/2023 Satisfactory for evaluation, endocervical/transformation zone component present   Final     Clinical Information 09/21/2023    Final                    Value:This result contains rich text formatting which cannot be displayed here.     Reflex Testing 09/21/2023 Yes regardless of result   Final     Previous Abnormal? 09/21/2023    Final                    Value:This result contains rich text formatting which cannot be displayed here.     Performing Labs 09/21/2023    Final                    Value:This result contains rich text formatting which cannot be displayed here.     hCG Urine Qualitative 09/21/2023 Negative  Negative Final     Cholesterol 09/21/2023 126  <200 mg/dL Final     Triglycerides 09/21/2023 56  <150 mg/dL Final     Direct Measure HDL 09/21/2023 63  >=50 mg/dL Final     LDL Cholesterol Calculated 09/21/2023 52  <=100 mg/dL Final     Non HDL Cholesterol 09/21/2023 63  <130 mg/dL Final        Lab Results   Component Value Date    WBC 7.0 06/03/2024    HGB 13.3 06/03/2024     06/03/2024    CHOL 126 09/21/2023    TRIG 56 09/21/2023    HDL 63 09/21/2023    ALT 21 06/03/2024    AST 19 06/03/2024     06/03/2024    BUN 13.0 06/03/2024    CO2 25 06/03/2024    TSH 1.34 06/03/2024        Liver Function Studies - No results for input(s): PROTTOTAL, ALBUMIN, BILITOTAL, ALKPHOS, AST, ALT, BILIDIRECT in the last 90467 hours.     PREVIOUS ENDOSCOPY    No results found for this or any previous visit.    ASSESSMENT/PLAN:  Joelle Enriquez is a 29 year old year old female with PMHx significant for ADHD and remote dx of IBS [facility].     She reported chronic episodes of diarrhea with flares associated with increased stooling and changes in stool consistency. Triggers include coffee,  chinese/mexican/pizza, and notably stress. For many years she self-initiated a very restrictive diet which seemed to have helped her lower GI symptoms and has slowly started introducing food groups back into her diet. FH significant for celiac disease in P Aunt, her celiac serologies were negative.  She started a daily bowel supplement with much improvement in her sx. Given the improvement in his stooling, I suspect that the diarrhea was driven by an irritable bowel syndrome.  Differential includes SIBO and less likely inflammatory bowel disease.  She was instructed to leave the stool samples from 2023 if she were to develop diarrhea in the similar pattern and consistency as previously. Future consideration would include GI RD consult if she felt it was helpful, breath testing.     At our last visit, her largest concern was that of new heartburn. RUQ US was WNL. H. Pylori stool antigen was NEG and so we initiated a high dose omeprazole trial x 12 weeks with resolution in her symptoms. Will try to taper PPI today but if sx flare, she was advised to go back on lowest effective dose. She was encouraged to MyChart me with any questions or concerns regarding the PPI taper.     No orders of the defined types were placed in this encounter.    Colorectal cancer screenin, unless otherwise clinically indicated     RTC -we agreed to as needed    Thank you for this consultation.  It was a pleasure to participate in the care of this patient; please contact me with any further questions.     Brigette Erazo PA-C    Follow up: As planned above. Today, I personally spent 13 minutes in direct face to face time with the patient, of which greater than 50% of the time was spent in patient education and counseling as described above. Approximately 10 minutes were spent on indirect care associated with the patient's consultation including but not limited to review of: patient medical records to date, clinic visits, hospital  records, lab results, imaging studies, procedural documentation, and coordinating care with other providers. The findings from this review are summarized in the above note. All of the above accounted for a cumulative time of 23 minutes and was performed on the date of service.

## 2024-07-17 NOTE — PROGRESS NOTES
"  GI CLINIC VISIT    HPI  Joelle Enriquez is a 29 year old year old female with PMHx of ADHD following with the [facility] GI group for diarrhea/heartburn. She est'd with me 9/2023, please see that note for full details.     Interval history  -H. pylori stool antigen negative off PPI  -Right upper quadrant ultrasound unremarkable  -TTG Ab NEG with intact total IgA   -CBC, CMP, TSH all WNL     7/17/24  Today she feels quite good.   Went on a trip to [place] - had a bit of an upset stomach when she got home from trip. Thinks stress was related to this   No further heartburn, no dysphagia/odynphagia, nausea/vomiting.  Still taking omeprazole 40 mg once daily.  No other medications including Pepcid.  Finds that raw onions and tomatoes make her symptoms worse.  Cooked red sauce is OK.   Stooling - not on citrucel as she has been busy with moving.  When she was taking fiber, she was stooling much more regularly  formed and manageable.  no lower abd pain and no blood or black in stools.  No diarrhea, stool test from 9/20/2023 not done  Appetite is stable. No unintentional weight loss.   No other questions or concerns    Family Hx  Paternal aunt with celiac disease - \"hives on her body\"  No other known family history or GI related malignancy (esophageal, gastric, pancreatic, liver or colon) or family history of IBD/celiac disease.     Social Hx     Occ use of NSAIDs or Tylenol.   Monthly Midol and ipburpron PM at night time x 3 days   No use of OTC herbal supplements/weight loss products.      Occ 1-2 beer once weekly ETOH  No tobacco products.   No No recreational drug use.     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    PREVIOUS SURGERIES:  No past surgical history on file.    ALLERGIES:   No Known Allergies    PERTINENT MEDICATIONS:    Current Outpatient Medications:     omeprazole (PRILOSEC) 40 MG DR capsule, Take 1 capsule (40 mg) by mouth daily Take 30-60 min on empty stomach before breakfast, Disp: 90 capsule, Rfl: 0    SOCIAL HISTORY:  Social History     Socioeconomic History    Marital status:      Spouse name: Not on file    Number of children: Not on file    Years of education: Not on file    Highest education level: Not on file   Occupational History    Not on file   Tobacco Use    Smoking status: Never    Smokeless tobacco: Never   Vaping Use    Vaping status: Never Used   Substance and Sexual Activity    Alcohol use: Yes     Comment: 1 a drink a day    Drug use: Never    Sexual activity: Yes     Partners: Male   Other Topics Concern    Not on file   Social History Narrative    Not on file     Social Determinants of Health     Financial Resource Strain: Low Risk  (9/21/2023)    Financial Resource Strain     Within the past 12 months, have you or your family members you live with been unable to get utilities (heat, electricity) when it was really needed?: No   Food Insecurity: Low Risk  (9/21/2023)    Food Insecurity     Within the past 12 months, did you worry that your food would run out before you got money to buy more?: No     Within the past 12 months, did the food you bought just not last and you didn t have money to get more?: No   Transportation Needs: Low Risk  (9/21/2023)    Transportation Needs     Within the past 12 months, has lack of transportation kept you from medical appointments, getting your medicines, non-medical meetings or appointments, work, or from getting things that you need?: No   Physical Activity: Not on file   Stress: Not on file   Social  "Connections: Not on file   Interpersonal Safety: Low Risk  (9/21/2023)    Interpersonal Safety     Do you feel physically and emotionally safe where you currently live?: Yes     Within the past 12 months, have you been hit, slapped, kicked or otherwise physically hurt by someone?: No     Within the past 12 months, have you been humiliated or emotionally abused in other ways by your partner or ex-partner?: No   Housing Stability: Low Risk  (9/21/2023)    Housing Stability     Do you have housing? : Yes     Are you worried about losing your housing?: No     FAMILY HISTORY:  No family history on file.    Past/family/social history reviewed and no changes    PHYSICAL EXAMINATION:  Vitals reviewed: /68   Pulse 77   Ht 1.702 m (5' 7\")   Wt 63.5 kg (140 lb)   SpO2 100%   BMI 21.93 kg/m    Wt:   Wt Readings from Last 2 Encounters:   07/17/24 63.5 kg (140 lb)   05/31/24 64 kg (141 lb)   General: Patient appears well in no acute distress.   Skin: No visualized rash or lesions on visualized skin  ENT: EOMI, no erythema, sclera icterus or discharge noted. MMM without lesions or thrush.  Resp: CTAB. Appears to be breathing comfortably without accessory muscle usage, speaking in full sentences, no cough  CV: RRR  GI: soft, non-distended, non-tender.   MSK: Appears to have normal range of motion based on visualized movements  Neurologic: No apparent tremors, facial movements symmetric, CN II-XII grossly intact  Psych: affect normal, alert and oriented      PERTINENT STUDIES:    Office Visit on 09/21/2023   Component Date Value Ref Range Status    Interpretation 09/21/2023 Negative for Intraepithelial Lesion or Malignancy (NILM)    Final    Comment 09/21/2023    Final                    Value:This result contains rich text formatting which cannot be displayed here.    Specimen Adequacy 09/21/2023 Satisfactory for evaluation, endocervical/transformation zone component present   Final    Clinical Information 09/21/2023    " Final                    Value:This result contains rich text formatting which cannot be displayed here.    Reflex Testing 09/21/2023 Yes regardless of result   Final    Previous Abnormal? 09/21/2023    Final                    Value:This result contains rich text formatting which cannot be displayed here.    Performing Labs 09/21/2023    Final                    Value:This result contains rich text formatting which cannot be displayed here.    hCG Urine Qualitative 09/21/2023 Negative  Negative Final    Cholesterol 09/21/2023 126  <200 mg/dL Final    Triglycerides 09/21/2023 56  <150 mg/dL Final    Direct Measure HDL 09/21/2023 63  >=50 mg/dL Final    LDL Cholesterol Calculated 09/21/2023 52  <=100 mg/dL Final    Non HDL Cholesterol 09/21/2023 63  <130 mg/dL Final        Lab Results   Component Value Date    WBC 7.0 06/03/2024    HGB 13.3 06/03/2024     06/03/2024    CHOL 126 09/21/2023    TRIG 56 09/21/2023    HDL 63 09/21/2023    ALT 21 06/03/2024    AST 19 06/03/2024     06/03/2024    BUN 13.0 06/03/2024    CO2 25 06/03/2024    TSH 1.34 06/03/2024        Liver Function Studies - No results for input(s): PROTTOTAL, ALBUMIN, BILITOTAL, ALKPHOS, AST, ALT, BILIDIRECT in the last 30605 hours.     PREVIOUS ENDOSCOPY    No results found for this or any previous visit.    ASSESSMENT/PLAN:  Joelle Enriquez is a 29 year old year old female with PMHx significant for ADHD and remote dx of IBS is [facility].     She reported chronic episodes of diarrhea with flares associated with increased stooling and changes in stool consistency. Triggers include coffee, chinese/mexican/pizza, and notably stress. For many years she self-initiated a very restrictive diet which seemed to have helped her lower GI symptoms and has slowly started introducing food groups back into her diet. FH significant for celiac disease in P Aunt, her celiac serologies were negative.  She started a daily  bowel supplement with much improvement in her sx. Given the improvement in his stooling, I suspect that the diarrhea was driven by an irritable bowel syndrome.  Differential includes SIBO and less likely inflammatory bowel disease.  She was instructed to leave the stool samples from 2023 if she were to develop diarrhea in the similar pattern and consistency as previously. Future consideration would include GI RD consult if she felt it was helpful, breath testing.     At our last visit, her largest concern was that of new heartburn. RUQ US was WNL. H. Pylori stool antigen was NEG and so we initiated a high dose omeprazole trial x 12 weeks with resolution in her symptoms. Will try to taper PPI today but if sx flare, she was advised to go back on lowest effective dose. She was encouraged to MyChart me with any questions or concerns regarding the PPI taper.     No orders of the defined types were placed in this encounter.    Colorectal cancer screenin, unless otherwise clinically indicated     RTC -we agreed to as needed    Thank you for this consultation.  It was a pleasure to participate in the care of this patient; please contact me with any further questions.     Brigette Erazo PA-C    Follow up: As planned above. Today, I personally spent 13 minutes in direct face to face time with the patient, of which greater than 50% of the time was spent in patient education and counseling as described above. Approximately 10 minutes were spent on indirect care associated with the patient's consultation including but not limited to review of: patient medical records to date, clinic visits, hospital records, lab results, imaging studies, procedural documentation, and coordinating care with other providers. The findings from this review are summarized in the above note. All of the above accounted for a cumulative time of 23 minutes and was performed on the date of service.

## 2024-07-17 NOTE — PATIENT INSTRUCTIONS
It was a pleasure taking care of you today.  I've included a brief summary of our discussion and care plan from today's visit below.  Please review this information with your primary care provider.  _______________________________________________________________________    My recommendations are summarized as follows:    Let's taper the omeprazole. Drop the dose to 20 mg once daily for 1-2 weeks. If you do OK on this, decrease dose again to 20 mg every 1-2 days x 1 week. If you do OK on this, decrease dose again to 20 mg every 3-4 days x 1 week. Then after this OK to stop.   If you get rebound symptoms, OK to take Pepcid 20 mg twice a day in addition to omeprazole to help with this.   Feel free to send a Sunsea message to me on the medication dose and your response   Agree with restarting Citrucel once you get a chance to do so  If you get diarrhea like before, leave the stool samples from Sept 2023     Please call our clinic or send a Sunsea message to us if you have any questions or concerns. Sunsea messages are answered by your nurse or doctor typically within 24 hours.  Please allow extra time on weekends and holidays    Return to GI Clinic in as needed months to review your progress.    _______________________________________________________________________    How do I schedule labs, imaging studies, or procedures that were ordered in clinic today?      Labs: To schedule lab appointment at the [facility], use my chart or call [phone]. If you have a [facility] lab closer to home where you are regularly seen you can give them a call.      Procedures: If a colonoscopy, upper endoscopy, breath test, esophageal manometry, or pH impedence was ordered today, our endoscopy team will call you to schedule this. If you have not heard from our endoscopy team within a week, please call [phone] option 2 to schedule.      Imaging Studies: If you were scheduled for a CT scan, X-ray, MRI, ultrasound,  HIDA scan or other imaging study, please call [phone] to have this scheduled.      Referral: If a referral to another specialty was ordered, expect a phone call or follow instructions above. If you have not heard from anyone regarding your referral in a week, please call our clinic to check the status.      Who do I call with any questions after my visit?  Please be in touch if there are any further questions that arise following today's visit.  There are multiple ways to contact your gastroenterology care team.       During business hours, you may reach a Gastroenterology nurse at [phone]     To schedule or reschedule an appointment, please call [phone].      You can always send a secure message through Control4.  Control4 messages are answered by your nurse or doctor typically within 24 hours.  Please allow extra time on weekends and holidays.       For urgent/emergent questions after business hours, you may reach the on-call GI Fellow by contacting the [facility]  at [phone].     How will I get the results of any tests ordered?    You will receive all of your results.  If you have signed up for [employer], any tests ordered at your visit will be available to you after your physician reviews them.  Typically this takes 1-2 weeks.  If there are urgent results that require a change in your care plan, your physician or nurse will call you to discuss the next steps.       What is Control4?  Control4 is a secure way for you to access all of your healthcare records from the [facility].  It is a web based computer program, so you can sign on to it from any location.  It also allows you to send secure messages to your care team.  I recommend signing up for Control4 access if you have not already done so and are comfortable with using a computer.       How to I schedule a follow-up visit?  If you did not schedule a follow-up visit today, please call [phone] to schedule a  follow-up office visit.      Sincerely,    Brigette Erazo PA-C  Gastroenterology

## 2024-07-17 NOTE — NURSING NOTE
"Chief Complaint   Patient presents with    Follow Up       Vitals:    07/17/24 0751   BP: 101/68   Pulse: 77   SpO2: 100%   Weight: 63.5 kg (140 lb)   Height: 1.702 m (5' 7\")       Body mass index is 21.93 kg/m .    [employer]    "

## 2024-08-22 ENCOUNTER — PATIENT OUTREACH (OUTPATIENT)
Dept: CARE COORDINATION | Facility: CLINIC | Age: 29
End: 2024-08-22
Payer: COMMERCIAL

## 2024-09-05 ENCOUNTER — PATIENT OUTREACH (OUTPATIENT)
Dept: CARE COORDINATION | Facility: CLINIC | Age: 29
End: 2024-09-05
Payer: COMMERCIAL

## 2024-10-03 ENCOUNTER — OFFICE VISIT (OUTPATIENT)
Dept: FAMILY MEDICINE | Facility: OTHER | Age: 29
End: 2024-10-03
Payer: COMMERCIAL

## 2024-10-03 VITALS
BODY MASS INDEX: 21.97 KG/M2 | SYSTOLIC BLOOD PRESSURE: 100 MMHG | DIASTOLIC BLOOD PRESSURE: 72 MMHG | HEIGHT: 67 IN | RESPIRATION RATE: 16 BRPM | WEIGHT: 140 LBS | OXYGEN SATURATION: 99 % | TEMPERATURE: 97.6 F | HEART RATE: 76 BPM

## 2024-10-03 DIAGNOSIS — N89.8 VAGINAL DRYNESS: ICD-10-CM

## 2024-10-03 DIAGNOSIS — Z00.00 ROUTINE GENERAL MEDICAL EXAMINATION AT A HEALTH CARE FACILITY: Primary | ICD-10-CM

## 2024-10-03 LAB
ESTRADIOL SERPL-MCNC: 68 PG/ML
FSH SERPL IRP2-ACNC: 5.2 MIU/ML
LH SERPL-ACNC: 13.8 MIU/ML

## 2024-10-03 PROCEDURE — 82670 ASSAY OF TOTAL ESTRADIOL: CPT | Performed by: FAMILY MEDICINE

## 2024-10-03 PROCEDURE — 90471 IMMUNIZATION ADMIN: CPT | Performed by: FAMILY MEDICINE

## 2024-10-03 PROCEDURE — 91320 SARSCV2 VAC 30MCG TRS-SUC IM: CPT | Performed by: FAMILY MEDICINE

## 2024-10-03 PROCEDURE — 83001 ASSAY OF GONADOTROPIN (FSH): CPT | Performed by: FAMILY MEDICINE

## 2024-10-03 PROCEDURE — 36415 COLL VENOUS BLD VENIPUNCTURE: CPT | Performed by: FAMILY MEDICINE

## 2024-10-03 PROCEDURE — 99395 PREV VISIT EST AGE 18-39: CPT | Mod: 57 | Performed by: FAMILY MEDICINE

## 2024-10-03 PROCEDURE — 83002 ASSAY OF GONADOTROPIN (LH): CPT | Performed by: FAMILY MEDICINE

## 2024-10-03 PROCEDURE — 90656 IIV3 VACC NO PRSV 0.5 ML IM: CPT | Performed by: FAMILY MEDICINE

## 2024-10-03 PROCEDURE — 99213 OFFICE O/P EST LOW 20 MIN: CPT | Mod: 25 | Performed by: FAMILY MEDICINE

## 2024-10-03 PROCEDURE — 90480 ADMN SARSCOV2 VAC 1/ONLY CMP: CPT | Performed by: FAMILY MEDICINE

## 2024-10-03 SDOH — HEALTH STABILITY: PHYSICAL HEALTH: ON AVERAGE, HOW MANY DAYS PER WEEK DO YOU ENGAGE IN MODERATE TO STRENUOUS EXERCISE (LIKE A BRISK WALK)?: 3 DAYS

## 2024-10-03 ASSESSMENT — ANXIETY QUESTIONNAIRES
7. FEELING AFRAID AS IF SOMETHING AWFUL MIGHT HAPPEN: NOT AT ALL
1. FEELING NERVOUS, ANXIOUS, OR ON EDGE: SEVERAL DAYS
GAD7 TOTAL SCORE: 6
3. WORRYING TOO MUCH ABOUT DIFFERENT THINGS: SEVERAL DAYS
2. NOT BEING ABLE TO STOP OR CONTROL WORRYING: SEVERAL DAYS
4. TROUBLE RELAXING: SEVERAL DAYS
GAD7 TOTAL SCORE: 6
7. FEELING AFRAID AS IF SOMETHING AWFUL MIGHT HAPPEN: NOT AT ALL
6. BECOMING EASILY ANNOYED OR IRRITABLE: SEVERAL DAYS
IF YOU CHECKED OFF ANY PROBLEMS ON THIS QUESTIONNAIRE, HOW DIFFICULT HAVE THESE PROBLEMS MADE IT FOR YOU TO DO YOUR WORK, TAKE CARE OF THINGS AT HOME, OR GET ALONG WITH OTHER PEOPLE: SOMEWHAT DIFFICULT
GAD7 TOTAL SCORE: 6
5. BEING SO RESTLESS THAT IT IS HARD TO SIT STILL: SEVERAL DAYS
8. IF YOU CHECKED OFF ANY PROBLEMS, HOW DIFFICULT HAVE THESE MADE IT FOR YOU TO DO YOUR WORK, TAKE CARE OF THINGS AT HOME, OR GET ALONG WITH OTHER PEOPLE?: SOMEWHAT DIFFICULT

## 2024-10-03 ASSESSMENT — PAIN SCALES - GENERAL: PAINLEVEL: NO PAIN (0)

## 2024-10-03 ASSESSMENT — SOCIAL DETERMINANTS OF HEALTH (SDOH): HOW OFTEN DO YOU GET TOGETHER WITH FRIENDS OR RELATIVES?: TWICE A WEEK

## 2024-10-03 NOTE — PATIENT INSTRUCTIONS
Patient Education   Preventive Care Advice   This is general advice given by our system to help you stay healthy. However, your care team may have specific advice just for you. Please talk to your care team about your preventive care needs.  Nutrition  Eat 5 or more servings of fruits and vegetables each day.  Try wheat bread, brown rice and whole grain pasta (instead of white bread, rice, and pasta).  Get enough calcium and vitamin D. Check the label on foods and aim for 100% of the RDA (recommended daily allowance).  Lifestyle  Exercise at least 150 minutes each week  (30 minutes a day, 5 days a week).  Do muscle strengthening activities 2 days a week. These help control your weight and prevent disease.  No smoking.  Wear sunscreen to prevent skin cancer.  Have a dental exam and cleaning every 6 months.  Yearly exams  See your health care team every year to talk about:  Any changes in your health.  Any medicines your care team has prescribed.  Preventive care, family planning, and ways to prevent chronic diseases.  Shots (vaccines)   HPV shots (up to age 26), if you've never had them before.  Hepatitis B shots (up to age 59), if you've never had them before.  COVID-19 shot: Get this shot when it's due.  Flu shot: Get a flu shot every year.  Tetanus shot: Get a tetanus shot every 10 years.  Pneumococcal, hepatitis A, and RSV shots: Ask your care team if you need these based on your risk.  Shingles shot (for age 50 and up)  General health tests  Diabetes screening:  Starting at age 35, Get screened for diabetes at least every 3 years.  If you are younger than age 35, ask your care team if you should be screened for diabetes.  Cholesterol test: At age 39, start having a cholesterol test every 5 years, or more often if advised.  Bone density scan (DEXA): At age 50, ask your care team if you should have this scan for osteoporosis (brittle bones).  Hepatitis C: Get tested at least once in your life.  STIs (sexually  transmitted infections)  Before age 24: Ask your care team if you should be screened for STIs.  After age 24: Get screened for STIs if you're at risk. You are at risk for STIs (including HIV) if:  You are sexually active with more than one person.  You don't use condoms every time.  You or a partner was diagnosed with a sexually transmitted infection.  If you are at risk for HIV, ask about PrEP medicine to prevent HIV.  Get tested for HIV at least once in your life, whether you are at risk for HIV or not.  Cancer screening tests  Cervical cancer screening: If you have a cervix, begin getting regular cervical cancer screening tests starting at age 21.  Breast cancer scan (mammogram): If you've ever had breasts, begin having regular mammograms starting at age 40. This is a scan to check for breast cancer.  Colon cancer screening: It is important to start screening for colon cancer at age 45.  Have a colonoscopy test every 10 years (or more often if you're at risk) Or, ask your provider about stool tests like a FIT test every year or Cologuard test every 3 years.  To learn more about your testing options, visit:   .  For help making a decision, visit:   https://bit.ly/au06466.  Prostate cancer screening test: If you have a prostate, ask your care team if a prostate cancer screening test (PSA) at age 55 is right for you.  Lung cancer screening: If you are a current or former smoker ages 50 to 80, ask your care team if ongoing lung cancer screenings are right for you.  For informational purposes only. Not to replace the advice of your health care provider. Copyright   2023 [employer]. All rights reserved. Clinically reviewed by the [facility] Transitions Program. HDF [id number] - REV 01/24.

## 2024-10-03 NOTE — PROGRESS NOTES
Preventive Care Visit  [facility]  Nicole Szymanski MD, MD, Family Medicine  Oct 3, 2024          ICD-10-CM    1. Routine general medical examination at a health care facility  Z00.00       2. Vaginal dryness  N89.8 Follicle stimulating hormone     Estradiol     Luteinizing Hormone     Follicle stimulating hormone     Estradiol     Luteinizing Hormone          Has been having hot flushes and feeling dizzy. Has had difficulty eating some foods and careful about diet, so unsure related to food intake or hormonal changes. Labs pending to clarify. Discussed regular food intake every 2-3 hours as she has lost her hunger drive throughout all this.  Also has vaginal dryness - found previously to have eczema and lubrication helpful, but not totally correcting this.    I spent a total of 25 minutes on the day of the visit.   Time spent by me doing chart review, history and exam, documentation and further activities per the note    Nicole Szymanski MD     Tamiko Lai is a 29 year old, presenting for the following:  Physical        10/3/2024     9:59 AM   Additional Questions   [facility]        Health Care Directive  Patient does not have a Health Care Directive or Living Will: Discussed advance care planning with patient; information given to patient to review.    HPI  LMP 9/18 and is having some night sweats and has been drier and wondering about early menopause.           10/3/2024   General Health   How would you rate your overall physical health? Good   Feel stress (tense, anxious, or unable to sleep) Only a little      (!) STRESS CONCERN      10/3/2024   Nutrition   Three or more servings of calcium each day? Yes   Diet: Regular (no restrictions)   How many servings of fruit and vegetables per day? (!) 2-3   How many sweetened beverages each day? 0-1            10/3/2024   Exercise   Days per week of moderate/strenous exercise 3 days            10/3/2024   Social Factors   Frequency of  gathering with friends or relatives Twice a week   Worry food won't last until get money to buy more No   Food not last or not have enough money for food? No   Do you have housing? (Housing is defined as stable permanent housing and does not include staying ouside in a car, in a tent, in an abandoned building, in an overnight shelter, or couch-surfing.) Yes   Are you worried about losing your housing? No   Lack of transportation? No   Unable to get utilities (heat,electricity)? No            10/3/2024   Dental   Dentist two times every year? (!) NO            10/3/2024   TB Screening   Were you born outside of the [place]? No                  10/3/2024   Substance Use   Alcohol more than 3/day or more than 7/wk No   Do you use any other substances recreationally? No        Social History     Tobacco Use    Smoking status: Never    Smokeless tobacco: Never   Vaping Use    Vaping status: Never Used   Substance Use Topics    Alcohol use: Yes     Comment: 1 a drink a day    Drug use: Never          Mammogram Screening - Patient under 40 years of age: Routine Mammogram Screening not recommended.         10/3/2024   STI Screening   New sexual partner(s) since last STI/HIV test? No        History of abnormal Pap smear: No - age 30- 64 PAP with HPV every 5 years recommended        Latest Ref Rng & Units 9/21/2023     7:45 AM 8/20/2020    11:34 AM   PAP / HPV   PAP  Negative for Intraepithelial Lesion or Malignancy (NILM)     PAP (Historical)   NIL    HPV 16 DNA Negative Negative     HPV 18 DNA Negative Negative     Other HR HPV Negative Negative             10/3/2024   Contraception/Family Planning   Questions about contraception or family planning (!) YES            Reviewed and updated as needed this visit by Provider   Tobacco  Allergies  Meds  Problems  Med Hx  Surg Hx  Fam Hx                  Review of Systems  Constitutional, HEENT, cardiovascular, pulmonary, GI, , musculoskeletal, neuro, skin, endocrine and psych  "systems are negative, except as otherwise noted.     Objective    Exam  /72 (BP Location: Right arm, Patient Position: Sitting, Cuff Size: Adult Regular)   Pulse 76   Temp 97.6  F (36.4  C) (Temporal)   Resp 16   Ht 1.702 m (5' 7\")   Wt 63.5 kg (140 lb)   LMP 09/18/2024   SpO2 99%   BMI 21.93 kg/m     Estimated body mass index is 21.93 kg/m  as calculated from the following:    Height as of this encounter: 1.702 m (5' 7\").    Weight as of this encounter: 63.5 kg (140 lb).    Physical Exam  GENERAL: alert and no distress  EYES: Eyes grossly normal to inspection, PERRL and conjunctivae and sclerae normal  HENT: ear canals and TM's normal, nose and mouth without ulcers or lesions  NECK: no adenopathy, no asymmetry, masses, or scars  RESP: lungs clear to auscultation - no rales, rhonchi or wheezes  CV: regular rate and rhythm, normal S1 S2, no S3 or S4, no murmur, click or rub, no peripheral edema  ABDOMEN: soft, nontender, no hepatosplenomegaly, no masses and bowel sounds normal  MS: no gross musculoskeletal defects noted, no edema  SKIN: no suspicious lesions or rashes  NEURO: Normal strength and tone, mentation intact and speech normal  PSYCH: mentation appears normal, affect normal/bright        Signed Electronically by: Nicole Szymanski MD, MD    Answers submitted by the patient for this visit:  Patient Health Questionnaire (G7) (Submitted on 10/3/2024)  DAYSI 7 TOTAL SCORE: 6    "

## 2024-11-02 ASSESSMENT — ANXIETY QUESTIONNAIRES: GAD7 TOTAL SCORE: 11

## 2025-02-27 ENCOUNTER — OFFICE VISIT (OUTPATIENT)
Dept: OBGYN | Facility: CLINIC | Age: 30
End: 2025-02-27
Payer: COMMERCIAL

## 2025-02-27 VITALS
DIASTOLIC BLOOD PRESSURE: 75 MMHG | SYSTOLIC BLOOD PRESSURE: 112 MMHG | WEIGHT: 148.7 LBS | HEART RATE: 92 BPM | RESPIRATION RATE: 16 BRPM | HEIGHT: 67 IN | BODY MASS INDEX: 23.34 KG/M2 | TEMPERATURE: 97.5 F

## 2025-02-27 DIAGNOSIS — R10.2 PELVIC PAIN IN FEMALE: Primary | ICD-10-CM

## 2025-02-27 NOTE — NURSING NOTE
"Initial /75 (BP Location: Left arm, Patient Position: Sitting, Cuff Size: Adult Regular)   Pulse 92   Temp 97.5  F (36.4  C) (Tympanic)   Resp 16   Ht 1.702 m (5' 7\")   Wt 67.4 kg (148 lb 11.2 oz)   LMP 01/16/2025 (Approximate)   BMI 23.29 kg/m   Estimated body mass index is 23.29 kg/m  as calculated from the following:    Height as of this encounter: 1.702 m (5' 7\").    Weight as of this encounter: 67.4 kg (148 lb 11.2 oz). .    "

## 2025-02-27 NOTE — PROGRESS NOTES
Gynecology Consult Note         HPI: Joelle Enriquez is a 29 year old P0 who presents with multiple concerns.  The patient states that she has not had continued issues with pain with intercourse.  She notes vaginal irritation and discomfort after intercourse as well as some pelvic pain.  She does note issues with decreased libido but states that her current partner is a sexual and she is therefore not currently bothered by lack of sexual activity.  She also endorses a history of eczema near her gallbladder that she currently feels is well-managed.  She is concerned about anovulation as she is not noticing changes in her cervical mucus that she did previously but states that she is having regular, monthly menses.  She is mostly wanting to confir that there are no significant/concerning findings that she is also able to manage her symptoms.    ROS: 10 pt ROS neg other than HPI    PMH:   History reviewed. No pertinent past medical history.    PSHx:   History reviewed. No pertinent surgical history.    Medications:   Current Outpatient Medications   Medication Sig Dispense Refill    omeprazole (PRILOSEC) 20 MG DR capsule Take 1 capsule (20 mg) by mouth daily Taper as discussed (Patient not taking: Reported on 2/27/2025) 30 capsule 2    omeprazole (PRILOSEC) 40 MG DR capsule Take 1 capsule (40 mg) by mouth daily Take 30-60 min on empty stomach before breakfast (Patient not taking: Reported on 2/27/2025) 90 capsule 0     No current facility-administered medications for this visit.        Allergies:    No Known Allergies    Social History:   Social History     Socioeconomic History    Marital status:      Spouse name: Not on file    Number of children: Not on file    Years of education: Not on file    Highest education level: Not on file   Occupational History    Not on file   Tobacco Use    Smoking status: Never    Smokeless tobacco: Never   Vaping Use    Vaping status: Never Used   Substance and Sexual Activity     Alcohol use: Yes     Comment: 1 a drink a day    Drug use: Never    Sexual activity: Yes     Partners: Male   Other Topics Concern    Not on file   Social History Narrative    Not on file     Social Drivers of Health     Financial Resource Strain: Low Risk  (10/3/2024)    Financial Resource Strain     Within the past 12 months, have you or your family members you live with been unable to get utilities (heat, electricity) when it was really needed?: No   Food Insecurity: Low Risk  (10/3/2024)    Food Insecurity     Within the past 12 months, did you worry that your food would run out before you got money to buy more?: No     Within the past 12 months, did the food you bought just not last and you didn t have money to get more?: No   Transportation Needs: Low Risk  (10/3/2024)    Transportation Needs     Within the past 12 months, has lack of transportation kept you from medical appointments, getting your medicines, non-medical meetings or appointments, work, or from getting things that you need?: No   Physical Activity: Unknown (10/3/2024)    Exercise Vital Sign     Days of Exercise per Week: 3 days     Minutes of Exercise per Session: Not on file   Stress: No Stress Concern Present (10/3/2024)    [employer] - Occupational Stress Questionnaire     Feeling of Stress : Only a little   Social Connections: Unknown (10/3/2024)    Social Connection and Isolation Panel [NHANES]     Frequency of Communication with Friends and Family: Not on file     Frequency of Social Gatherings with Friends and Family: Twice a week     Attends Congregational Services: Not on file     Active Member of Clubs or Organizations: Not on file     Attends Club or Organization Meetings: Not on file     Marital Status: Not on file   Interpersonal Safety: Low Risk  (9/21/2023)    Interpersonal Safety     Do you feel physically and emotionally safe where you currently live?: Yes     Within the past 12 months, have you been  "hit, slapped, kicked or otherwise physically hurt by someone?: No     Within the past 12 months, have you been humiliated or emotionally abused in other ways by your partner or ex-partner?: No   Housing Stability: Low Risk  (10/3/2024)    Housing Stability     Do you have housing? : Yes     Are you worried about losing your housing?: No       Family History:  History reviewed. No pertinent family history.    Physical Exam:   Vitals:    02/27/25 0949   BP: 112/75   BP Location: Left arm   Patient Position: Sitting   Cuff Size: Adult Regular   Pulse: 92   Resp: 16   Temp: 97.5  F (36.4  C)   TempSrc: Tympanic   Weight: 67.4 kg (148 lb 11.2 oz)   Height: 1.702 m (5' 7\")      Gen: lying in bed, NAD  CV: Reg rate, well perfused  Pulm: no increased work of breathing  Abd: non-tender, non-distended, no masses   Pelvis: normal appearing external genitalia, vaginal mucosa, cervix, bimanual exam with normal size and contour of uterus with no adnexal masses, significant discomfort with initial touch and placement of speculum visible contracture of pelvic floor musculature  Extremities: non-tender, no erythema; no edema  Psych: normal mood and affect  Neuro: no focal deficits      A&P: Joelle Enriquez is a 29 year old presents with multiple concerns including pelvic pain, decreased libido, vaginal irritation, concern for anovulation.  With regards to pelvic pain patient does endorse \"lower abdominal discomfort worse after intercourse.  She also notices this discomfort after alcohol use.  Discussed with patient with regard to alcohol use would be more suspicious for gastrointestinal cause.  However, given her ongoing issues with pelvic discomfort, particularly after intercourse or masturbation did recommend that we complete a pelvic ultrasound to confirm no concerning structural causes found.  On exam today she does have involuntary contraction of pelvic floor musculature and discussed with patient potential diagnosis of " vaginismus    .  Did recommend she attend pelvic floor physical therapy if she is bothered by her symptoms to see if we can improve things, though currently she is not sexually active but may be in the future and also to help with pain with exams.  She states understanding and is in agreement.  With regard to vaginal irritation did offer patient both wet prep and mycoplasma/Ureaplasma swabs.  She states that she currently has her symptoms under control and she feels they are more related to eczema.  She declines swabs today.  Discussed with patient regarding her concern for anovulation that we could certainly check a day 21 progesterone to confirm ovulation but is having regular, monthly menses without incontinence.  Concern for anovulation based off of consistency of vaginal mucus alone.  She states understanding and feels reassured.    Also discussed with patient options for trying to increase libido and sexual desire.  She states that she is currently not bothered by her lack of sexual intercourse and understands that she can reach out in the future should she desire to discuss this further.    Plan of care is currently for pelvic floor PT to help address pelvic discomfort and pain with intercourse and to complete a pelvic ultrasound to rule out any structural causes.      45 minutes reviewing chart, obtaining history, counseling, examining, coordinating care, documenting.    Miriam Bucio MD   2/27/2025 2:30 PM

## 2025-03-10 ENCOUNTER — ANCILLARY PROCEDURE (OUTPATIENT)
Dept: ULTRASOUND IMAGING | Facility: CLINIC | Age: 30
End: 2025-03-10
Attending: OBSTETRICS & GYNECOLOGY
Payer: COMMERCIAL

## 2025-03-10 DIAGNOSIS — R10.2 PELVIC PAIN IN FEMALE: ICD-10-CM

## 2025-03-10 PROCEDURE — 76856 US EXAM PELVIC COMPLETE: CPT | Mod: TC | Performed by: RADIOLOGY

## 2025-03-10 PROCEDURE — 76830 TRANSVAGINAL US NON-OB: CPT | Mod: TC | Performed by: RADIOLOGY

## 2025-04-14 ENCOUNTER — THERAPY VISIT (OUTPATIENT)
Age: 30
End: 2025-04-14
Attending: OBSTETRICS & GYNECOLOGY
Payer: COMMERCIAL

## 2025-04-14 DIAGNOSIS — R10.2 PELVIC PAIN IN FEMALE: Primary | ICD-10-CM

## 2025-04-14 PROCEDURE — 97110 THERAPEUTIC EXERCISES: CPT | Mod: GP | Performed by: PHYSICAL THERAPIST

## 2025-04-14 PROCEDURE — 97530 THERAPEUTIC ACTIVITIES: CPT | Mod: GP | Performed by: PHYSICAL THERAPIST

## 2025-04-14 PROCEDURE — 97161 PT EVAL LOW COMPLEX 20 MIN: CPT | Mod: GP | Performed by: PHYSICAL THERAPIST

## 2025-04-14 NOTE — PROGRESS NOTES
PHYSICAL THERAPY EVALUATION  Type of Visit: Evaluation  Joelle presents today with pain with deep vaginal penetration. She was diagnosed with eczma in her perineal region in 2020.             Subjective         Presenting condition or subjective complaint: pelvic floor therapy  Date of onset: 02/27/25    Relevant medical history:     Dates & types of surgery:      Prior diagnostic imaging/testing results: Other ultrasound   Prior therapy history for the same diagnosis, illness or injury: No      Living Environment  Social support: Alone   Type of home: Apartment/condo   Stairs to enter the home: Yes 12 Is there a railing: Yes     Ramp: No   Stairs inside the home: No       Help at home: None  Equipment owned:       Employment: Yes   Hobbies/Interests: curling, hiking, reading    Patient goals for therapy: have sex       Objective      PELVIC EVALUATION  ADDITIONAL HISTORY:  Sex assigned at birth: Female  Gender identity: Female    Pronouns: She/Her Hers      Bladder History:  Feels bladder filling: Yes  Triggers for feeling of inability to wait to go to the bathroom: No    How long can you wait to urinate: 1  Gets up at night to urinate: Yes 1  Can stop the flow of urine when urinating: Yes  Volume of urine usually released: Medium   Other issues:    Number of bladder infections in last 12 months:    Fluid intake per day: 50      Medications taken for bladder: No     Activities causing urine leak:      Amount of urine typically leaked:    Pads used to help with leaking:        She has a past history of frequent UTIs.   She voids about every 2-4 hours during the day.     Bowel History:  Frequency of bowel movement:    Consistency of stool: Soft    Ignores the urge to defecate: No  Other bowel issues:    Length of time spent trying to have a bowel movement:    Sometimes she feels as though she does not empty fully.    Sexual Function History:  Sexual orientation: Straight    Sexually active: No  Lubrication  used: Yes Yes  Pelvic pain: Deep penetration (rectal or vaginal)    Pain or difficulty with orgasms/erection/ejaculation: Yes dry and painful  State of menopause:    Hormone medications: No      Are you currently pregnant: No  Number of previous pregnancies: 0  Number of deliveries: 0  Have you been diagnosed with pelvic prolapse or abdominal separation: No  Do you get regular exercise: Yes  I do this type of exercise: walking and weight lifting  Have you tried pelvic floor strengthening exercises for 4 weeks: No  Do you have any history of trauma that is relevant to your care that you d like to share: Yes, I d like to discuss it with my provider in person.  Her low back does seem to get irritated occasionally after lifting.     Discussed reason for referral regarding pelvic health needs and external/internal pelvic floor muscle examination with patient/guardian.  Opportunity provided to ask questions and verbal consent for assessment and intervention was given.    POSTURE: WNL  HIP SCREEN:  Strength:  hip flexion, hip ER, hip IR: 5/5 bilaterally, glute med: 5-/5 bilaterally, glute max: 5-/5 bilaterally    Functional Strength Testing: Double Leg Squat: Good technique/no significant findings  FLEXIBILITY: tightness right adductors, piriformis and glute max bilaterally    PELVIC EXAM  External Visual Inspection:  At rest: Normal  With voluntary pelvic floor contraction: Perineal elevation  Relaxation of PFM: Yes    Integumentary:   Introitus: Unremarkable    External Digital Palpation per Perineum:   Ischiocavernosis: Tightness  Bulbo cavernosis: Unremarkable  Transverse perineal: Tightness  Levator ani: Tightness    Internal Digital Palpation:  Per Vagina:  Tenderness  Myofascial Resistance to Palpation: Soft  Digital Muscle Performance: P (Power): 3/5  Compensations: Abdominals, Adductors, Gluts, Breath holding  Relaxation Post-Contraction: Partial/delayed relaxation  Pt notes discomfort with movement of tissues  upon assessment, but no increase in discomfort with pressure on all 3 layers of pelvic floor muscles.    Assessment & Plan   CLINICAL IMPRESSIONS  Medical Diagnosis: pelvic pain in female    Treatment Diagnosis: pelvic pain   Impression/Assessment: Patient is a 29 year old female with pelvic  pain complaints.  The following significant findings have been identified: Pain, Decreased ROM/flexibility, Decreased strength, Impaired muscle performance, and Decreased activity tolerance. These impairments interfere with their ability to perform self care tasks and recreational activities as compared to previous level of function.     Clinical Decision Making (Complexity):  Clinical Presentation: Stable/Uncomplicated  Clinical Presentation Rationale: based on medical and personal factors listed in PT evaluation  Clinical Decision Making (Complexity): Low complexity    PLAN OF CARE  Treatment Interventions:  Modalities: Biofeedback, Cryotherapy, Cupping, Dry Needling, E-stim, Hot Pack  Interventions: Gait Training, Manual Therapy, Neuromuscular Re-education, Therapeutic Activity, Therapeutic Exercise, Self-Care/Home Management    Long Term Goals     PT Goal 1  Goal Description: Pt will have 0/10p with insertion of largest dilator.  Rationale: to maximize safety and independence with performance of ADLs and functional tasks  Target Date: 07/07/25  PT Goal 2  Goal Description: Pt will have 0/10p with insertion and removal of tampon.  Rationale: to maximize safety and independence with performance of ADLs and functional tasks  Target Date: 07/07/25      Frequency of Treatment: every 1-2 weeks  Duration of Treatment: 12 weeks    Education Assessment:   Learner/Method: Patient    Risks and benefits of evaluation/treatment have been explained.   Patient/Family/caregiver agrees with Plan of Care.     Evaluation Time:     PT Eval, Low Complexity Minutes (28403): 33     Signing Clinician: Kaylen Nathan PT

## 2025-09-03 ENCOUNTER — PATIENT OUTREACH (OUTPATIENT)
Dept: CARE COORDINATION | Facility: CLINIC | Age: 30
End: 2025-09-03
Payer: COMMERCIAL